# Patient Record
Sex: FEMALE | Race: WHITE | NOT HISPANIC OR LATINO | Employment: FULL TIME
[De-identification: names, ages, dates, MRNs, and addresses within clinical notes are randomized per-mention and may not be internally consistent; named-entity substitution may affect disease eponyms.]

---

## 2017-03-01 ENCOUNTER — RECORDS - HEALTHEAST (OUTPATIENT)
Dept: ADMINISTRATIVE | Facility: OTHER | Age: 52
End: 2017-03-01

## 2017-03-17 ENCOUNTER — COMMUNICATION - HEALTHEAST (OUTPATIENT)
Dept: ADMINISTRATIVE | Facility: CLINIC | Age: 52
End: 2017-03-17

## 2017-03-18 ENCOUNTER — COMMUNICATION - HEALTHEAST (OUTPATIENT)
Dept: MIDWIFE SERVICES | Facility: CLINIC | Age: 52
End: 2017-03-18

## 2017-03-18 ENCOUNTER — AMBULATORY - HEALTHEAST (OUTPATIENT)
Dept: MIDWIFE SERVICES | Facility: CLINIC | Age: 52
End: 2017-03-18

## 2017-03-18 DIAGNOSIS — N95.1 HOT FLASHES, MENOPAUSAL: ICD-10-CM

## 2017-04-12 ENCOUNTER — OFFICE VISIT - HEALTHEAST (OUTPATIENT)
Dept: MIDWIFE SERVICES | Facility: CLINIC | Age: 52
End: 2017-04-12

## 2017-04-12 DIAGNOSIS — N95.1 HOT FLASHES, MENOPAUSAL: ICD-10-CM

## 2017-04-12 DIAGNOSIS — Z12.31 VISIT FOR SCREENING MAMMOGRAM: ICD-10-CM

## 2017-04-12 DIAGNOSIS — I34.1 MITRAL VALVE PROLAPSE: ICD-10-CM

## 2017-04-12 DIAGNOSIS — Z23 NEED FOR VACCINATION: ICD-10-CM

## 2017-04-12 DIAGNOSIS — Z12.39 BREAST CANCER SCREENING: ICD-10-CM

## 2017-04-12 DIAGNOSIS — Z01.419 WELL WOMAN EXAM: ICD-10-CM

## 2017-04-12 LAB
CHOLEST SERPL-MCNC: 233 MG/DL
FASTING STATUS PATIENT QL REPORTED: ABNORMAL
HDLC SERPL-MCNC: 80 MG/DL
LDLC SERPL CALC-MCNC: 140 MG/DL
TRIGL SERPL-MCNC: 67 MG/DL

## 2017-04-12 ASSESSMENT — MIFFLIN-ST. JEOR: SCORE: 1207.31

## 2017-04-17 LAB
HPV INTERPRETATION - HISTORICAL: NORMAL
HPV INTERPRETER - HISTORICAL: NORMAL

## 2017-04-18 LAB
BKR LAB AP ABNORMAL BLEEDING: NO
BKR LAB AP BIRTH CONTROL/HORMONES: NORMAL
BKR LAB AP CERVICAL APPEARANCE: NORMAL
BKR LAB AP GYN ADEQUACY: NORMAL
BKR LAB AP GYN INTERPRETATION: NORMAL
BKR LAB AP HPV REFLEX: NORMAL
BKR LAB AP LMP: 2014
BKR LAB AP PATIENT STATUS: NORMAL
BKR LAB AP PREVIOUS ABNORMAL: NORMAL
BKR LAB AP PREVIOUS NORMAL: NORMAL
HIGH RISK?: NO
PATH REPORT.COMMENTS IMP SPEC: NORMAL
RESULT FLAG (HE HISTORICAL CONVERSION): NORMAL

## 2017-05-11 ENCOUNTER — COMMUNICATION - HEALTHEAST (OUTPATIENT)
Dept: MIDWIFE SERVICES | Facility: CLINIC | Age: 52
End: 2017-05-11

## 2017-08-22 ENCOUNTER — COMMUNICATION - HEALTHEAST (OUTPATIENT)
Dept: FAMILY MEDICINE | Facility: CLINIC | Age: 52
End: 2017-08-22

## 2018-01-13 ENCOUNTER — HOSPITAL ENCOUNTER (OUTPATIENT)
Dept: MAMMOGRAPHY | Facility: HOSPITAL | Age: 53
Discharge: HOME | End: 2018-01-13
Attending: INTERNAL MEDICINE

## 2018-01-13 DIAGNOSIS — Z12.31 VISIT FOR SCREENING MAMMOGRAM: ICD-10-CM

## 2018-01-24 ENCOUNTER — OFFICE VISIT - HEALTHEAST (OUTPATIENT)
Dept: MIDWIFE SERVICES | Facility: CLINIC | Age: 53
End: 2018-01-24

## 2018-01-24 ENCOUNTER — AMBULATORY - HEALTHEAST (OUTPATIENT)
Dept: INTERNAL MEDICINE | Facility: CLINIC | Age: 53
End: 2018-01-24

## 2018-01-24 DIAGNOSIS — Z01.419 WELL WOMAN EXAM: ICD-10-CM

## 2018-01-24 DIAGNOSIS — Z82.49 FAMILY HISTORY OF CORONARY ARTERY DISEASE: ICD-10-CM

## 2018-01-24 DIAGNOSIS — N95.1 HOT FLASHES, MENOPAUSAL: ICD-10-CM

## 2018-01-24 DIAGNOSIS — Z82.49 FAMILY HISTORY OF HEART ATTACK: ICD-10-CM

## 2018-01-24 LAB
CHOLEST SERPL-MCNC: 239 MG/DL
FASTING STATUS PATIENT QL REPORTED: YES
HDLC SERPL-MCNC: 91 MG/DL
LDLC SERPL CALC-MCNC: 134 MG/DL
TRIGL SERPL-MCNC: 69 MG/DL

## 2018-01-24 ASSESSMENT — MIFFLIN-ST. JEOR: SCORE: 1198.24

## 2018-01-29 LAB — LIPOPROTEIN (A) - HISTORICAL: 3 MG/DL (ref 0–30)

## 2018-04-25 ENCOUNTER — OFFICE VISIT - HEALTHEAST (OUTPATIENT)
Dept: MIDWIFE SERVICES | Facility: CLINIC | Age: 53
End: 2018-04-25

## 2018-04-25 DIAGNOSIS — E78.00 HYPERCHOLESTEROLEMIA: ICD-10-CM

## 2018-04-25 DIAGNOSIS — N95.0 POSTMENOPAUSAL VAGINAL BLEEDING: ICD-10-CM

## 2018-04-25 DIAGNOSIS — B00.1 HERPES LABIALIS: ICD-10-CM

## 2018-04-25 LAB
CHOLEST SERPL-MCNC: 213 MG/DL
FASTING STATUS PATIENT QL REPORTED: YES
HDLC SERPL-MCNC: 74 MG/DL
LDLC SERPL CALC-MCNC: 128 MG/DL
TRIGL SERPL-MCNC: 57 MG/DL

## 2018-04-25 ASSESSMENT — MIFFLIN-ST. JEOR: SCORE: 1175.56

## 2018-04-26 ENCOUNTER — COMMUNICATION - HEALTHEAST (OUTPATIENT)
Dept: MIDWIFE SERVICES | Facility: CLINIC | Age: 53
End: 2018-04-26

## 2018-05-04 ENCOUNTER — OFFICE VISIT - HEALTHEAST (OUTPATIENT)
Dept: FAMILY MEDICINE | Facility: CLINIC | Age: 53
End: 2018-05-04

## 2018-05-04 DIAGNOSIS — K13.0 CHEILITIS: ICD-10-CM

## 2018-05-10 ENCOUNTER — OFFICE VISIT - HEALTHEAST (OUTPATIENT)
Dept: OBGYN | Facility: CLINIC | Age: 53
End: 2018-05-10

## 2018-05-10 DIAGNOSIS — N95.0 PMB (POSTMENOPAUSAL BLEEDING): ICD-10-CM

## 2018-05-10 DIAGNOSIS — Z79.890 POSTMENOPAUSAL HRT (HORMONE REPLACEMENT THERAPY): ICD-10-CM

## 2018-05-10 ASSESSMENT — MIFFLIN-ST. JEOR: SCORE: 1171.02

## 2018-05-17 LAB
LAB AP CHARGES (HE HISTORICAL CONVERSION): NORMAL
PATH REPORT.COMMENTS IMP SPEC: NORMAL
PATH REPORT.COMMENTS IMP SPEC: NORMAL
PATH REPORT.FINAL DX SPEC: NORMAL
PATH REPORT.GROSS SPEC: NORMAL
PATH REPORT.MICROSCOPIC SPEC OTHER STN: NORMAL
PATH REPORT.RELEVANT HX SPEC: NORMAL
RESULT FLAG (HE HISTORICAL CONVERSION): NORMAL

## 2018-06-05 ENCOUNTER — RECORDS - HEALTHEAST (OUTPATIENT)
Dept: ADMINISTRATIVE | Facility: OTHER | Age: 53
End: 2018-06-05

## 2018-06-29 ENCOUNTER — RECORDS - HEALTHEAST (OUTPATIENT)
Dept: ADMINISTRATIVE | Facility: OTHER | Age: 53
End: 2018-06-29

## 2018-07-16 ENCOUNTER — COMMUNICATION - HEALTHEAST (OUTPATIENT)
Dept: ADMINISTRATIVE | Facility: CLINIC | Age: 53
End: 2018-07-16

## 2018-10-03 ENCOUNTER — OFFICE VISIT - HEALTHEAST (OUTPATIENT)
Dept: MIDWIFE SERVICES | Facility: CLINIC | Age: 53
End: 2018-10-03

## 2018-10-03 DIAGNOSIS — Z23 NEED FOR VACCINATION: ICD-10-CM

## 2018-10-03 DIAGNOSIS — L65.9 HAIR LOSS: ICD-10-CM

## 2018-10-03 DIAGNOSIS — Z78.9 VEGAN DIET: ICD-10-CM

## 2018-10-03 LAB
CHOLEST SERPL-MCNC: 198 MG/DL
FASTING STATUS PATIENT QL REPORTED: YES
FERRITIN SERPL-MCNC: 77 NG/ML (ref 10–130)
HDLC SERPL-MCNC: 77 MG/DL
HGB BLD-MCNC: 13 G/DL (ref 12–16)
LDLC SERPL CALC-MCNC: 108 MG/DL
T3FREE SERPL-MCNC: 2.4 PG/ML (ref 1.9–3.9)
T4 FREE SERPL-MCNC: 1 NG/DL (ref 0.7–1.8)
TRIGL SERPL-MCNC: 67 MG/DL
TSH SERPL DL<=0.005 MIU/L-ACNC: 1.34 UIU/ML (ref 0.3–5)
VIT B12 SERPL-MCNC: 971 PG/ML (ref 213–816)

## 2018-10-03 ASSESSMENT — MIFFLIN-ST. JEOR: SCORE: 1175.56

## 2018-10-04 LAB — THYROID PEROXIDASE ANTIBODIES - HISTORICAL: <3 IU/ML (ref 0–5.6)

## 2018-10-07 LAB — ZINC SERPL-MCNC: 77 UG/DL (ref 60–120)

## 2018-10-08 ENCOUNTER — COMMUNICATION - HEALTHEAST (OUTPATIENT)
Dept: INTERNAL MEDICINE | Facility: CLINIC | Age: 53
End: 2018-10-08

## 2018-10-17 ENCOUNTER — AMBULATORY - HEALTHEAST (OUTPATIENT)
Dept: NURSING | Facility: CLINIC | Age: 53
End: 2018-10-17

## 2018-10-23 ENCOUNTER — COMMUNICATION - HEALTHEAST (OUTPATIENT)
Dept: ADMINISTRATIVE | Facility: CLINIC | Age: 53
End: 2018-10-23

## 2018-11-13 ENCOUNTER — RECORDS - HEALTHEAST (OUTPATIENT)
Dept: ADMINISTRATIVE | Facility: OTHER | Age: 53
End: 2018-11-13

## 2019-02-05 ENCOUNTER — AMBULATORY - HEALTHEAST (OUTPATIENT)
Dept: OBGYN | Facility: CLINIC | Age: 54
End: 2019-02-05

## 2019-02-05 ENCOUNTER — COMMUNICATION - HEALTHEAST (OUTPATIENT)
Dept: ADMINISTRATIVE | Facility: CLINIC | Age: 54
End: 2019-02-05

## 2019-02-05 DIAGNOSIS — N95.1 HOT FLASHES, MENOPAUSAL: ICD-10-CM

## 2019-02-13 ENCOUNTER — OFFICE VISIT - HEALTHEAST (OUTPATIENT)
Dept: MIDWIFE SERVICES | Facility: CLINIC | Age: 54
End: 2019-02-13

## 2019-02-13 DIAGNOSIS — N95.1 HOT FLASHES, MENOPAUSAL: ICD-10-CM

## 2019-02-13 DIAGNOSIS — Z01.419 WELL WOMAN EXAM: ICD-10-CM

## 2019-02-13 ASSESSMENT — MIFFLIN-ST. JEOR: SCORE: 1195.4

## 2019-03-04 ENCOUNTER — AMBULATORY - HEALTHEAST (OUTPATIENT)
Dept: NURSING | Facility: CLINIC | Age: 54
End: 2019-03-04

## 2019-03-04 DIAGNOSIS — Z23 IMMUNIZATION DUE: ICD-10-CM

## 2019-03-11 ENCOUNTER — OFFICE VISIT - HEALTHEAST (OUTPATIENT)
Dept: FAMILY MEDICINE | Facility: CLINIC | Age: 54
End: 2019-03-11

## 2019-03-11 DIAGNOSIS — B00.2 ORAL HERPES SIMPLEX INFECTION: ICD-10-CM

## 2019-03-11 DIAGNOSIS — L65.9 HAIR LOSS: ICD-10-CM

## 2019-03-11 DIAGNOSIS — E55.9 VITAMIN D DEFICIENCY: ICD-10-CM

## 2019-03-11 DIAGNOSIS — F43.9 STRESS AT HOME: ICD-10-CM

## 2019-03-11 LAB
BASOPHILS # BLD AUTO: 0 THOU/UL (ref 0–0.2)
BASOPHILS NFR BLD AUTO: 1 % (ref 0–2)
CRP SERPL HS-MCNC: 1.2 MG/L (ref 0–3)
EOSINOPHIL # BLD AUTO: 0.3 THOU/UL (ref 0–0.4)
EOSINOPHIL NFR BLD AUTO: 7 % (ref 0–6)
ERYTHROCYTE [DISTWIDTH] IN BLOOD BY AUTOMATED COUNT: 11.3 % (ref 11–14.5)
HCT VFR BLD AUTO: 38.8 % (ref 35–47)
HGB BLD-MCNC: 13 G/DL (ref 12–16)
LYMPHOCYTES # BLD AUTO: 1.5 THOU/UL (ref 0.8–4.4)
LYMPHOCYTES NFR BLD AUTO: 42 % (ref 20–40)
MCH RBC QN AUTO: 30.9 PG (ref 27–34)
MCHC RBC AUTO-ENTMCNC: 33.4 G/DL (ref 32–36)
MCV RBC AUTO: 93 FL (ref 80–100)
MONOCYTES # BLD AUTO: 0.3 THOU/UL (ref 0–0.9)
MONOCYTES NFR BLD AUTO: 8 % (ref 2–10)
NEUTROPHILS # BLD AUTO: 1.5 THOU/UL (ref 2–7.7)
NEUTROPHILS NFR BLD AUTO: 42 % (ref 50–70)
PLATELET # BLD AUTO: 189 THOU/UL (ref 140–440)
PMV BLD AUTO: 7.3 FL (ref 7–10)
RBC # BLD AUTO: 4.19 MILL/UL (ref 3.8–5.4)
WBC: 3.6 THOU/UL (ref 4–11)

## 2019-03-11 ASSESSMENT — MIFFLIN-ST. JEOR: SCORE: 1195.4

## 2019-03-12 ENCOUNTER — COMMUNICATION - HEALTHEAST (OUTPATIENT)
Dept: FAMILY MEDICINE | Facility: CLINIC | Age: 54
End: 2019-03-12

## 2019-03-12 LAB
25(OH)D3 SERPL-MCNC: 43.2 NG/ML (ref 30–80)
25(OH)D3 SERPL-MCNC: 43.2 NG/ML (ref 30–80)

## 2019-06-04 ENCOUNTER — COMMUNICATION - HEALTHEAST (OUTPATIENT)
Dept: MIDWIFE SERVICES | Facility: CLINIC | Age: 54
End: 2019-06-04

## 2019-06-04 DIAGNOSIS — N95.1 HOT FLASHES, MENOPAUSAL: ICD-10-CM

## 2019-06-19 ENCOUNTER — COMMUNICATION - HEALTHEAST (OUTPATIENT)
Dept: MIDWIFE SERVICES | Facility: CLINIC | Age: 54
End: 2019-06-19

## 2019-06-21 ENCOUNTER — AMBULATORY - HEALTHEAST (OUTPATIENT)
Dept: OBGYN | Facility: CLINIC | Age: 54
End: 2019-06-21

## 2019-06-21 DIAGNOSIS — N95.1 HOT FLASHES, MENOPAUSAL: ICD-10-CM

## 2019-09-03 ENCOUNTER — RECORDS - HEALTHEAST (OUTPATIENT)
Dept: ADMINISTRATIVE | Facility: OTHER | Age: 54
End: 2019-09-03

## 2019-09-17 ENCOUNTER — RECORDS - HEALTHEAST (OUTPATIENT)
Dept: ADMINISTRATIVE | Facility: OTHER | Age: 54
End: 2019-09-17

## 2019-10-21 ENCOUNTER — RECORDS - HEALTHEAST (OUTPATIENT)
Dept: ADMINISTRATIVE | Facility: OTHER | Age: 54
End: 2019-10-21

## 2019-10-21 ENCOUNTER — COMMUNICATION - HEALTHEAST (OUTPATIENT)
Dept: INTERNAL MEDICINE | Facility: CLINIC | Age: 54
End: 2019-10-21

## 2019-10-22 ENCOUNTER — RECORDS - HEALTHEAST (OUTPATIENT)
Dept: ADMINISTRATIVE | Facility: OTHER | Age: 54
End: 2019-10-22

## 2019-10-24 ENCOUNTER — OFFICE VISIT - HEALTHEAST (OUTPATIENT)
Dept: INTERNAL MEDICINE | Facility: CLINIC | Age: 54
End: 2019-10-24

## 2019-10-24 DIAGNOSIS — Z12.11 SCREEN FOR COLON CANCER: ICD-10-CM

## 2019-10-24 DIAGNOSIS — Z01.818 PREOP EXAMINATION: ICD-10-CM

## 2019-10-24 DIAGNOSIS — Z12.31 VISIT FOR SCREENING MAMMOGRAM: ICD-10-CM

## 2019-10-24 ASSESSMENT — MIFFLIN-ST. JEOR: SCORE: 1172.72

## 2019-11-05 ENCOUNTER — RECORDS - HEALTHEAST (OUTPATIENT)
Dept: ADMINISTRATIVE | Facility: OTHER | Age: 54
End: 2019-11-05

## 2019-12-10 ENCOUNTER — RECORDS - HEALTHEAST (OUTPATIENT)
Dept: ADMINISTRATIVE | Facility: OTHER | Age: 54
End: 2019-12-10

## 2019-12-15 ENCOUNTER — TRANSFERRED RECORDS (OUTPATIENT)
Dept: MULTI SPECIALTY CLINIC | Facility: CLINIC | Age: 54
End: 2019-12-15
Payer: COMMERCIAL

## 2019-12-15 ENCOUNTER — RECORDS - HEALTHEAST (OUTPATIENT)
Dept: ADMINISTRATIVE | Facility: OTHER | Age: 54
End: 2019-12-15

## 2019-12-15 LAB — COLOGUARD-ABSTRACT: NEGATIVE

## 2020-01-28 ENCOUNTER — RECORDS - HEALTHEAST (OUTPATIENT)
Dept: ADMINISTRATIVE | Facility: OTHER | Age: 55
End: 2020-01-28

## 2020-01-28 ENCOUNTER — COMMUNICATION - HEALTHEAST (OUTPATIENT)
Dept: ADMINISTRATIVE | Facility: CLINIC | Age: 55
End: 2020-01-28

## 2020-01-28 DIAGNOSIS — N95.1 HOT FLASHES, MENOPAUSAL: ICD-10-CM

## 2020-02-26 ENCOUNTER — OFFICE VISIT - HEALTHEAST (OUTPATIENT)
Dept: MIDWIFE SERVICES | Facility: CLINIC | Age: 55
End: 2020-02-26

## 2020-02-26 DIAGNOSIS — Z12.31 VISIT FOR SCREENING MAMMOGRAM: ICD-10-CM

## 2020-02-26 DIAGNOSIS — Z00.00 HEALTHCARE MAINTENANCE: ICD-10-CM

## 2020-02-26 DIAGNOSIS — Z01.419 WELL WOMAN EXAM: ICD-10-CM

## 2020-02-26 DIAGNOSIS — N95.1 HOT FLASHES, MENOPAUSAL: ICD-10-CM

## 2020-02-26 DIAGNOSIS — Z12.39 BREAST CANCER SCREENING: ICD-10-CM

## 2020-02-26 LAB
CHOLEST SERPL-MCNC: 203 MG/DL
FASTING STATUS PATIENT QL REPORTED: YES
HDLC SERPL-MCNC: 78 MG/DL
LDLC SERPL CALC-MCNC: 108 MG/DL
TRIGL SERPL-MCNC: 83 MG/DL

## 2020-02-26 ASSESSMENT — MIFFLIN-ST. JEOR: SCORE: 1181.79

## 2020-03-02 ENCOUNTER — COMMUNICATION - HEALTHEAST (OUTPATIENT)
Dept: MIDWIFE SERVICES | Facility: CLINIC | Age: 55
End: 2020-03-02

## 2020-04-07 ENCOUNTER — RECORDS - HEALTHEAST (OUTPATIENT)
Dept: ADMINISTRATIVE | Facility: OTHER | Age: 55
End: 2020-04-07

## 2020-05-18 ENCOUNTER — COMMUNICATION - HEALTHEAST (OUTPATIENT)
Dept: ADMINISTRATIVE | Facility: CLINIC | Age: 55
End: 2020-05-18

## 2020-05-18 ENCOUNTER — COMMUNICATION - HEALTHEAST (OUTPATIENT)
Dept: MIDWIFE SERVICES | Facility: CLINIC | Age: 55
End: 2020-05-18

## 2020-05-18 DIAGNOSIS — N95.1 HOT FLASHES, MENOPAUSAL: ICD-10-CM

## 2020-05-20 ENCOUNTER — COMMUNICATION - HEALTHEAST (OUTPATIENT)
Dept: ADMINISTRATIVE | Facility: CLINIC | Age: 55
End: 2020-05-20

## 2020-05-20 DIAGNOSIS — N95.1 HOT FLASHES, MENOPAUSAL: ICD-10-CM

## 2020-05-29 ENCOUNTER — COMMUNICATION - HEALTHEAST (OUTPATIENT)
Dept: ADMINISTRATIVE | Facility: CLINIC | Age: 55
End: 2020-05-29

## 2020-06-03 ENCOUNTER — COMMUNICATION - HEALTHEAST (OUTPATIENT)
Dept: MIDWIFE SERVICES | Facility: CLINIC | Age: 55
End: 2020-06-03

## 2020-06-04 ENCOUNTER — AMBULATORY - HEALTHEAST (OUTPATIENT)
Dept: MIDWIFE SERVICES | Facility: CLINIC | Age: 55
End: 2020-06-04

## 2020-06-04 DIAGNOSIS — N95.1 HOT FLASHES, MENOPAUSAL: ICD-10-CM

## 2020-06-08 ENCOUNTER — RECORDS - HEALTHEAST (OUTPATIENT)
Dept: ADMINISTRATIVE | Facility: OTHER | Age: 55
End: 2020-06-08

## 2020-07-22 ENCOUNTER — HOSPITAL ENCOUNTER (OUTPATIENT)
Dept: MAMMOGRAPHY | Facility: CLINIC | Age: 55
Discharge: HOME | End: 2020-07-22
Attending: ADVANCED PRACTICE MIDWIFE

## 2020-07-22 DIAGNOSIS — Z12.31 VISIT FOR SCREENING MAMMOGRAM: ICD-10-CM

## 2020-07-28 ENCOUNTER — COMMUNICATION - HEALTHEAST (OUTPATIENT)
Dept: MIDWIFE SERVICES | Facility: CLINIC | Age: 55
End: 2020-07-28

## 2020-08-27 ENCOUNTER — RECORDS - HEALTHEAST (OUTPATIENT)
Dept: ADMINISTRATIVE | Facility: OTHER | Age: 55
End: 2020-08-27

## 2020-11-11 ENCOUNTER — RECORDS - HEALTHEAST (OUTPATIENT)
Dept: ADMINISTRATIVE | Facility: OTHER | Age: 55
End: 2020-11-11

## 2021-02-10 ENCOUNTER — COMMUNICATION - HEALTHEAST (OUTPATIENT)
Dept: ADMINISTRATIVE | Facility: CLINIC | Age: 56
End: 2021-02-10

## 2021-02-10 ENCOUNTER — COMMUNICATION - HEALTHEAST (OUTPATIENT)
Dept: MIDWIFE SERVICES | Facility: CLINIC | Age: 56
End: 2021-02-10

## 2021-03-17 ENCOUNTER — OFFICE VISIT - HEALTHEAST (OUTPATIENT)
Dept: MIDWIFE SERVICES | Facility: CLINIC | Age: 56
End: 2021-03-17

## 2021-03-17 ENCOUNTER — AMBULATORY - HEALTHEAST (OUTPATIENT)
Dept: LAB | Facility: CLINIC | Age: 56
End: 2021-03-17

## 2021-03-17 DIAGNOSIS — Z12.31 ENCOUNTER FOR SCREENING MAMMOGRAM FOR MALIGNANT NEOPLASM OF BREAST: ICD-10-CM

## 2021-03-17 DIAGNOSIS — Z01.419 WELL WOMAN EXAM: ICD-10-CM

## 2021-03-17 DIAGNOSIS — L68.9 EXCESSIVE HAIR GROWTH: ICD-10-CM

## 2021-03-17 DIAGNOSIS — Z12.11 SCREEN FOR COLON CANCER: ICD-10-CM

## 2021-03-17 DIAGNOSIS — N95.1 HOT FLASHES, MENOPAUSAL: ICD-10-CM

## 2021-03-17 LAB
CHOLEST SERPL-MCNC: 224 MG/DL
FASTING STATUS PATIENT QL REPORTED: YES
FASTING STATUS PATIENT QL REPORTED: YES
GLUCOSE BLD-MCNC: 94 MG/DL (ref 70–125)
HDLC SERPL-MCNC: 72 MG/DL
HIV 1+2 AB+HIV1 P24 AG SERPL QL IA: NEGATIVE
LDLC SERPL CALC-MCNC: 137 MG/DL
TRIGL SERPL-MCNC: 76 MG/DL
VIT B12 SERPL-MCNC: 549 PG/ML (ref 213–816)

## 2021-03-17 RX ORDER — ESTRADIOL 1 MG/1
1 TABLET ORAL DAILY
Qty: 90 TABLET | Refills: 3 | Status: SHIPPED | OUTPATIENT
Start: 2021-03-17 | End: 2022-03-17

## 2021-03-17 RX ORDER — MEDROXYPROGESTERONE ACETATE 2.5 MG/1
2.5 TABLET ORAL DAILY
Qty: 90 TABLET | Refills: 3 | Status: SHIPPED | OUTPATIENT
Start: 2021-03-17 | End: 2022-05-09

## 2021-03-17 ASSESSMENT — MIFFLIN-ST. JEOR: SCORE: 1190.3

## 2021-03-17 ASSESSMENT — PATIENT HEALTH QUESTIONNAIRE - PHQ9: SUM OF ALL RESPONSES TO PHQ QUESTIONS 1-9: 6

## 2021-03-18 LAB
25(OH)D3 SERPL-MCNC: 36.8 NG/ML (ref 30–80)
HCV AB SERPL QL IA: NEGATIVE

## 2021-03-29 ENCOUNTER — RECORDS - HEALTHEAST (OUTPATIENT)
Dept: ADMINISTRATIVE | Facility: OTHER | Age: 56
End: 2021-03-29

## 2021-03-29 ENCOUNTER — RECORDS - HEALTHEAST (OUTPATIENT)
Dept: BONE DENSITY | Facility: CLINIC | Age: 56
End: 2021-03-29

## 2021-03-29 DIAGNOSIS — Z01.419 ENCOUNTER FOR GYNECOLOGICAL EXAMINATION (GENERAL) (ROUTINE) WITHOUT ABNORMAL FINDINGS: ICD-10-CM

## 2021-04-13 ENCOUNTER — RECORDS - HEALTHEAST (OUTPATIENT)
Dept: ADMINISTRATIVE | Facility: OTHER | Age: 56
End: 2021-04-13

## 2021-04-28 ENCOUNTER — OFFICE VISIT - HEALTHEAST (OUTPATIENT)
Dept: INTERNAL MEDICINE | Facility: CLINIC | Age: 56
End: 2021-04-28

## 2021-04-28 DIAGNOSIS — I34.1 MITRAL VALVE PROLAPSE: ICD-10-CM

## 2021-04-28 DIAGNOSIS — R00.2 PALPITATIONS: ICD-10-CM

## 2021-04-28 LAB
T4 FREE SERPL-MCNC: 1.1 NG/DL (ref 0.7–1.8)
TSH SERPL DL<=0.005 MIU/L-ACNC: 1.21 UIU/ML (ref 0.3–5)

## 2021-04-28 ASSESSMENT — MIFFLIN-ST. JEOR: SCORE: 1181.22

## 2021-04-29 LAB
ATRIAL RATE - MUSE: 63 BPM
DIASTOLIC BLOOD PRESSURE - MUSE: NORMAL
INTERPRETATION ECG - MUSE: NORMAL
P AXIS - MUSE: 62 DEGREES
PR INTERVAL - MUSE: 148 MS
QRS DURATION - MUSE: 94 MS
QT - MUSE: 416 MS
QTC - MUSE: 425 MS
R AXIS - MUSE: 29 DEGREES
SYSTOLIC BLOOD PRESSURE - MUSE: NORMAL
T AXIS - MUSE: 40 DEGREES
VENTRICULAR RATE- MUSE: 63 BPM

## 2021-05-27 ASSESSMENT — PATIENT HEALTH QUESTIONNAIRE - PHQ9: SUM OF ALL RESPONSES TO PHQ QUESTIONS 1-9: 6

## 2021-05-29 NOTE — TELEPHONE ENCOUNTER
Central PA team  [phone]  Pool: HE PA MED ([id number])          PA has been initiated.       PA form completed and faxed insurance via Cover My Meds     Key:   [id number] Case ID: [id number]     Medication:  Provera 2.5MG tablets    Insurance:  [employer]        Response will be received via fax and may take up to 5-10 business days depending on plan

## 2021-05-29 NOTE — TELEPHONE ENCOUNTER
I have sent in new Rx for prometrium per note on Rx.  Could you let the patient know?  Thanks  EDDI

## 2021-05-29 NOTE — TELEPHONE ENCOUNTER
Medication Name:   PROVERA 2.5 mg tablet 90 tablet 3 6/4/2019     Sig - Route: Take 1 tablet (2.5 mg total) by mouth daily. - Oral    Sent to pharmacy as: PROVERA 2.5 mg tablet    Notes to Pharmacy: If Prometrium 100 mg it is a less expensive option for this patient, please feel free to substitute per patient.    E-Prescribing Status: Receipt confirmed by pharmacy (6/4/2019  6:06 PM CDT)          Insurance Plan: [employer] LIS[employer] FROST:    Patient ID Number: [id number]    Please start PA process

## 2021-05-30 VITALS — WEIGHT: 137 LBS | BODY MASS INDEX: 22.82 KG/M2 | HEIGHT: 65 IN

## 2021-05-31 VITALS — BODY MASS INDEX: 22.49 KG/M2 | HEIGHT: 65 IN | WEIGHT: 135 LBS

## 2021-06-01 VITALS — BODY MASS INDEX: 21.66 KG/M2 | HEIGHT: 65 IN | WEIGHT: 130 LBS

## 2021-06-01 VITALS — BODY MASS INDEX: 21.66 KG/M2 | WEIGHT: 130 LBS | HEIGHT: 65 IN

## 2021-06-01 VITALS — BODY MASS INDEX: 21.72 KG/M2 | WEIGHT: 130.5 LBS

## 2021-06-01 VITALS — WEIGHT: 129 LBS | HEIGHT: 65 IN | BODY MASS INDEX: 21.49 KG/M2

## 2021-06-02 VITALS — WEIGHT: 137 LBS | BODY MASS INDEX: 23.39 KG/M2 | HEIGHT: 64 IN

## 2021-06-02 VITALS — HEIGHT: 64 IN | WEIGHT: 137 LBS | BODY MASS INDEX: 23.39 KG/M2

## 2021-06-02 NOTE — TELEPHONE ENCOUNTER
New Appointment Needed  What is the reason for the visit:    Pre-Op Appt Request  When is the surgery? :  10.29.19  Where is the surgery?:   [facility] in [place]  Who is the surgeon? :  Dr. Pichardo  What type of surgery is being done?: right shoulder rotator cuff and torn bicep  Provider Preference: Any available  How soon do you need to be seen?: this week  Waitlist offered?: No  Okay to leave a detailed message:  Yes

## 2021-06-02 NOTE — PATIENT INSTRUCTIONS - HE
1. No contraindication to having the surgery.     2. cologuard will be sent.     3. Set up mammogram as discussed.

## 2021-06-02 NOTE — PROGRESS NOTES
Preoperative Exam    Scheduled Procedure: Right Shoulder Rotator cuff and torn bicep  Surgery Date:  10/29/19  Surgery Location: [facility] Fax: [phone]    Surgeon:  Dr. Pichardo    Assessment/Plan:     1. Visit for screening mammogram  Last mammogram 1/2018. She plans to schedule.    2. Screen for colon cancer  Not high risk, agrees to cologuard.   - Cologuard    3. Preop examination  She is medically stable.  Her exam and history today reveal no contraindication to having the right shoulder surgery.  I recommend proceeding as planned.      Surgical Procedure Risk: Low (reported cardiac risk generally < 1%)  Have you had prior anesthesia?: Yes  Have you or any family members had a previous anesthesia reaction:  No  Do you or any family members have a history of a clotting or bleeding disorder?: No  Cardiac Risk Assessment: no increased risk for major cardiac complications    APPROVAL GIVEN to proceed with proposed procedure, without further diagnostic evaluation    She has history of mitral valve prolapse, asymptomatic.  No murmur or click heard today.     Functional Status: Independent  Patient plans to recover at home with family.     Subjective:      Jeni Cortes is a 54 y.o. female who presents for a preoperative consultation. She has elected to proceed with right shoulder surgery for rotator cuff disorder.  She feels well, otherwise, without unusual dyspnea or any cough or fever, or symptoms of illness.  No history of clotting or bleeding. No palpitations or history of symptoms from mitral valve prolapse    All other systems reviewed and are negative, other than those listed in the HPI.    Pertinent History  Do you have difficulty breathing or chest pain after walking up a flight of stairs: No  History of obstructive sleep apnea: No  Steroid use in the last 6 months: No  Frequent Aspirin/NSAID use: No  Prior Blood Transfusion: No  Prior Blood Transfusion Reaction: Unknwn  If for some reason  prior to, during or after the procedure, if it is medically indicated, would you be willing to have a blood transfusion?:  There is no transfusion refusal.    Current Outpatient Medications   Medication Sig Dispense Refill     cyanocobalamin, vitamin B-12, (VITAMIN B12 ORAL) Take by mouth.       ESTRACE 1 mg tablet Take 1 tablet (1 mg total) by mouth daily. 90 tablet 1     multivitamin therapeutic tablet Take 1 tablet by mouth daily.       progesterone (PROMETRIUM) 100 MG capsule Take 1 capsule (100 mg total) by mouth daily. 90 capsule 4     BIOTIN ORAL Take by mouth.       PROVERA 2.5 mg tablet Take 1 tablet (2.5 mg total) by mouth daily. 90 tablet 3     No Known Allergies    Patient Active Problem List   Diagnosis     Shoulder Tendonitis     Hot flashes, menopausal     Well woman exam     Mitral valve prolapse     Breast cancer screening     Visit for screening mammogram     Hypercholesterolemia     Herpes labialis     Past Medical History:   Diagnosis Date     Mitral valve prolapse      Past Surgical History:   Procedure Laterality Date     NO PAST SURGERIES       Social History     Socioeconomic History     Marital status:      Spouse name: Sharon     Number of children: 1     Years of education: Not on file     Highest education level: Not on file   Occupational History     Occupation: Marketing   Social Needs     Financial resource strain: Not on file     Food insecurity:     Worry: Not on file     Inability: Not on file     Transportation needs:     Medical: Not on file     Non-medical: Not on file   Tobacco Use     Smoking status: Never Smoker     Smokeless tobacco: Never Used   Substance and Sexual Activity     Alcohol use: Yes     Drug use: No     Sexual activity: Yes     Partners: Male     Birth control/protection: Post-menopausal   Lifestyle     Physical activity:     Days per week: Not on file     Minutes per session: Not on file     Stress: Not on file   Relationships     Social connections:      "Talks on phone: Not on file     Gets together: Not on file     Attends Scientology service: Not on file     Active member of club or organization: Not on file     Attends meetings of clubs or organizations: Not on file     Relationship status: Not on file     Intimate partner violence:     Fear of current or ex partner: Not on file     Emotionally abused: Not on file     Physically abused: Not on file     Forced sexual activity: Not on file   Other Topics Concern     Not on file   Social History Narrative          Patient Care Team:  Maurice López MD as PCP - Joanna Mccarthy MD as Assigned PCP    Objective:     Vitals:    10/24/19 0906   BP: 110/70   Pulse: 66   SpO2: 100%   Weight: 132 lb (59.9 kg)   Height: 5' 4.25\" (1.632 m)     PHYSICAL EXAM:  General Appearance: In no acute distress  /70 (Patient Site: Left Arm, Patient Position: Sitting, Cuff Size: Adult Regular)   Pulse 66   Ht 5' 4.25\" (1.632 m)   Wt 132 lb (59.9 kg)   SpO2 100%   BMI 22.48 kg/m    NECK:  without cervical or axillary adenopathy  RESPIRATORY: Clear  CARDIOVASCULAR: S1, S2   ABDOMEN: soft, flat, and non-tender  EXTREMITIES: No joint swelling, or inflammation, no ulcer or edema  NEUROLOGIC: No arm or leg  weakness, speech is clear, gait is normal  PSYCHIATRIC: Oriented X 3, without confusion, behavior and affect normal, thinking is clear    Patient Instructions   1. No contraindication to having the surgery.     2. cologuard will be sent.     3. Set up mammogram as discussed.     Immunization History   Administered Date(s) Administered     Influenza, seasonal,quad inj 6-35 mos 10/27/2014     Influenza,seasonal quad, PF, =/> 6months 10/03/2018     Tdap 04/12/2017     ZOSTER, RECOMBINANT, IM 10/17/2018, 03/04/2019     Electronically signed by Maurice López MD 10/25/19 9:09 AM  "

## 2021-06-03 ENCOUNTER — HOSPITAL ENCOUNTER (OUTPATIENT)
Dept: CARDIOLOGY | Facility: CLINIC | Age: 56
Discharge: HOME | End: 2021-06-03
Attending: INTERNAL MEDICINE

## 2021-06-03 ENCOUNTER — COMMUNICATION - HEALTHEAST (OUTPATIENT)
Dept: INTERNAL MEDICINE | Facility: CLINIC | Age: 56
End: 2021-06-03

## 2021-06-03 VITALS
BODY MASS INDEX: 22.53 KG/M2 | OXYGEN SATURATION: 100 % | DIASTOLIC BLOOD PRESSURE: 70 MMHG | HEIGHT: 64 IN | HEART RATE: 66 BPM | SYSTOLIC BLOOD PRESSURE: 110 MMHG | WEIGHT: 132 LBS

## 2021-06-03 DIAGNOSIS — R00.2 PALPITATIONS: ICD-10-CM

## 2021-06-03 LAB
AORTIC ROOT: 1.9 CM
AORTIC VALVE MEAN VELOCITY: 88.3 CM/S
ASCENDING AORTA: 2.9 CM
AV DIMENSIONLESS INDEX VTI: 0.6
AV MEAN GRADIENT: 4 MMHG
AV PEAK GRADIENT: 6.9 MMHG
AV VELOCITY RATIO: 0.7
BSA FOR ECHO PROCEDURE: 1.66 M2
CV BLOOD PRESSURE: NORMAL MMHG
CV ECHO HEIGHT: 64.5 IN
CV ECHO WEIGHT: 133 LBS
DOP CALC AO PEAK VEL: 131 CM/S
DOP CALC AO VTI: 31 CM
DOP CALC LVOT PEAK VEL: 90.2 CM/S
DOP CALCLVOT PEAK VEL VTI: 19.7 CM
EJECTION FRACTION: 64 % (ref 55–75)
FRACTIONAL SHORTENING: 35.8 % (ref 28–44)
INTERVENTRICULAR SEPTUM IN END DIASTOLE: 0.9 CM (ref 0.6–0.9)
IVS/PW RATIO: 1.2
LA AREA 1: 16.5 CM2
LA AREA 2: 17.8 CM2
LEFT ATRIUM LENGTH: 4.49 CM
LEFT ATRIUM SIZE: 2.9 CM
LEFT ATRIUM VOLUME INDEX: 33.5 ML/M2
LEFT ATRIUM VOLUME: 55.6 ML
LEFT VENTRICLE DIASTOLIC VOLUME INDEX: 54.2 CM3/M2 (ref 29–61)
LEFT VENTRICLE DIASTOLIC VOLUME: 90 CM3 (ref 46–106)
LEFT VENTRICLE HEART RATE: 62 BPM
LEFT VENTRICLE MASS INDEX: 73.5 G/M2
LEFT VENTRICLE SYSTOLIC VOLUME INDEX: 19.3 CM3/M2 (ref 8–24)
LEFT VENTRICLE SYSTOLIC VOLUME: 32 CM3 (ref 14–42)
LEFT VENTRICULAR INTERNAL DIMENSION IN DIASTOLE: 4.64 CM (ref 3.8–5.2)
LEFT VENTRICULAR INTERNAL DIMENSION IN SYSTOLE: 2.98 CM (ref 2.2–3.5)
LEFT VENTRICULAR MASS: 122.1 G
LEFT VENTRICULAR OUTFLOW TRACT MEAN GRADIENT: 2 MMHG
LEFT VENTRICULAR OUTFLOW TRACT MEAN VELOCITY: 60.9 CM/S
LEFT VENTRICULAR OUTFLOW TRACT PEAK GRADIENT: 3 MMHG
LEFT VENTRICULAR POSTERIOR WALL IN END DIASTOLE: 0.73 CM (ref 0.6–0.9)
MITRAL VALVE E/A RATIO: 1.8
MV AVERAGE E/E' RATIO: 8.9 CM/S
MV DECELERATION TIME: 211 MS
MV E'TISSUE VEL-LAT: 8.87 CM/S
MV E'TISSUE VEL-MED: 8.29 CM/S
MV LATERAL E/E' RATIO: 8.6
MV MEDIAL E/E' RATIO: 9.2
MV PEAK A VELOCITY: 41.5 CM/S
MV PEAK E VELOCITY: 76 CM/S
NUC REST DIASTOLIC VOLUME INDEX: 2128 LBS
NUC REST SYSTOLIC VOLUME INDEX: 64.5 IN
PV ACCELERATION TIME: 190 MS
TRICUSPID REGURGITATION PEAK PRESSURE GRADIENT: 26.4 MMHG
TRICUSPID VALVE ANULAR PLANE SYSTOLIC EXCURSION: 2.7 CM
TRICUSPID VALVE PEAK REGURGITANT VELOCITY: 257 CM/S

## 2021-06-03 ASSESSMENT — MIFFLIN-ST. JEOR: SCORE: 1181.22

## 2021-06-04 VITALS
SYSTOLIC BLOOD PRESSURE: 108 MMHG | DIASTOLIC BLOOD PRESSURE: 60 MMHG | HEIGHT: 64 IN | HEART RATE: 60 BPM | BODY MASS INDEX: 22.88 KG/M2 | WEIGHT: 134 LBS

## 2021-06-05 VITALS
DIASTOLIC BLOOD PRESSURE: 62 MMHG | SYSTOLIC BLOOD PRESSURE: 110 MMHG | HEIGHT: 65 IN | BODY MASS INDEX: 22.16 KG/M2 | OXYGEN SATURATION: 99 % | WEIGHT: 133 LBS | HEART RATE: 64 BPM

## 2021-06-05 VITALS
HEIGHT: 65 IN | DIASTOLIC BLOOD PRESSURE: 64 MMHG | WEIGHT: 135 LBS | HEART RATE: 80 BPM | SYSTOLIC BLOOD PRESSURE: 108 MMHG | BODY MASS INDEX: 22.49 KG/M2

## 2021-06-05 NOTE — TELEPHONE ENCOUNTER
Telephone call to patient.  Advised that medication was refilled for 90 days.  Advised that patient will be due to be seen for her annual exam in February, 2020.  Requested that she call back to schedule.  Contact information provided.

## 2021-06-05 NOTE — TELEPHONE ENCOUNTER
Pt went to  her refills of medications she has taken for years and it looks like the progesterone got filled but not the estrodyl. Please call pt to let her know once that is filled and she can pick it up. Confirmed Pharmacy as [facility] in [place].

## 2021-06-06 NOTE — PROGRESS NOTES
Assessment:   1.  Annual well woman exam  2.  Healthcare maintenance  3.  Hormone replacement therapy for postmenopausal hot flashes  4.  History of hypercholesterolemia, resolved after implementing vegan diet  5.  Vegan diet     Plan:      1. Discussed nutrition and exercise.  Advised MVI, Vitamin D3 4000IU geltab and an omega 3 supplement daily   2. Blood tests: Fasting lipid panel  3. Breast self exam technique reviewed and patient encouraged to perform self-exam monthly.  Screening mammogram ordered  4. Contraception: Postmenopausal  5.  Next pap due 2022. HPV co-testing discussed with that pap, then paps q 5 yrs if both negative.  6.  Estrace and Prometrium ordered for 1 year.  7.  RTC 1 year for annual physical exam, PRN    Subjective:      Jeni Cortes is a 54 y.o. female who presents for an annual exam.  Denies unusual vaginal discharge, dyspareunia, lesions, bloating or vaginal bleeding.    Healthy Habits:   Regular Exercise: Yes   Sunscreen Use: Yes  Healthy Diet: Yes  Dental Visits Regularly: Yes  Seat Belt: Yes  Sexually active: Yes  STI risk No  domestic violence No    Self Breast Exam Monthly:Yes  Colonoscopy: Planned with Dr. López to perform Cologuard  Lipid Profile: Yes  Glucose Screen: No  Prevention of Osteoporosis: Yes  Last Dexa: N/A      Immunization History   Administered Date(s) Administered     Influenza, seasonal,quad inj 6-35 mos 10/27/2014     Influenza,seasonal quad, PF, =/> 6months 10/03/2018     Tdap 2017     ZOSTER, RECOMBINANT, IM 10/17/2018, 2019     Immunization status: up to date and documented.    Gynecologic History  No LMP recorded. Patient is postmenopausal.  Contraception: post menopausal status    Cancer screening:   Last Pap: 2017. Results were: Normal, negative HPV  Last mammogram: 2018. Results were: normal      OB History    Para Term  AB Living   1 1 1 0 0 1   SAB TAB Ectopic Multiple Live Births   0 0 0 0 1      #  Discharge Summary - [address] [facility]    Patient Information: Srinath Cheek 48 y o  female MRN: [id number]  Unit/Bed#: -01 Encounter: [id number]    Discharging Physician / Practitioner: Conchita Aldana DO  PCP: Lesly Rhodes DO  Admission Date: 1/27/2019  Discharge Date: 01/28/19    Disposition:     Home    Reason for Admission:  Hypertensive urgency    Discharge Diagnoses:     Principal Problem:    Hypertensive urgency  Active Problems:    Acute nonintractable headache  Resolved Problems:    * No resolved hospital problems  *      Consultations During Hospital Stay:  · None    Procedures Performed:     · None    Significant Findings / Test Results:     · Blood pressure on admission was 244/124  This was at 5:31 p m     By 8:36 p m , the blood pressure was 203/97  By 9:00 p m  The blood pressure was 170/93  Overnight the blood pressure has been ranging between 138/80 to 168/92  This is prior to amlodipine dosing  After amlodipine dosing today, blood pressure is 142/96  · CT head - no acute intracranial abnormality  · Metabolic profile within normal limits  · CBC initially with a white blood cell count of 10 84  However, by the following morning was 9 63   · Hemoglobin is stable  Platelet counts normal   · Troponin normal     Incidental Findings:   · None     Test Results Pending at Discharge (will require follow up): · None     Outpatient Tests Requested:  · Repeat blood pressure within 1 week    Complications:  None    Hospital Course:     Srinath Cheek is a 48 y o  female patient who originally presented to the hospital on 1/27/2019 due to headache and lightheadedness  She states she began having symptoms of sinus congestion and dry cough for the past couple weeks and had plans to go to urgent care today for further valuation    She says that today she developed intermittent headache on the top of her head, and further reports that just before leaving for urgent care, she got Outcome Date GA Lbr Elijah/2nd Weight Sex Delivery Anes PTL Lv   1 Term     M    OMAR       Current Outpatient Medications   Medication Sig Dispense Refill     cyanocobalamin, vitamin B-12, (VITAMIN B12 ORAL) Take by mouth.       ESTRACE 1 mg tablet Take 1 tablet (1 mg total) by mouth daily. 90 tablet 3     multivitamin therapeutic tablet Take 1 tablet by mouth daily.       progesterone (PROMETRIUM) 100 MG capsule Take 1 capsule (100 mg total) by mouth daily. 90 capsule 3     No current facility-administered medications for this visit.      Past Medical History:   Diagnosis Date     Mitral valve prolapse      Past Surgical History:   Procedure Laterality Date     NO PAST SURGERIES       SHOULDER ARTHROSCOPY Right 10/2019     WISDOM TOOTH EXTRACTION       Patient has no known allergies.  Family History   Problem Relation Age of Onset     Asthma Mother      Depression Mother      Vision loss Mother      Cancer Mother         kidney     Dementia Father      Hearing loss Father      Heart disease Father      Heart attack Father 59     Breast cancer Sister      Vision loss Sister      Depression Son      Social History     Socioeconomic History     Marital status:      Spouse name: Sharon     Number of children: 1     Years of education: Not on file     Highest education level: Not on file   Occupational History     Occupation: Marketing   Social Needs     Financial resource strain: Not on file     Food insecurity:     Worry: Not on file     Inability: Not on file     Transportation needs:     Medical: Not on file     Non-medical: Not on file   Tobacco Use     Smoking status: Never Smoker     Smokeless tobacco: Never Used   Substance and Sexual Activity     Alcohol use: Yes     Drug use: No     Sexual activity: Yes     Partners: Male     Birth control/protection: Post-menopausal   Lifestyle     Physical activity:     Days per week: Not on file     Minutes per session: Not on file     Stress: Not on file  up to go to the bathroom and had visual cahgnes she describes as "kalaeidoscope vision" and blurred that lasted about two minutes  She also felt very lightheaded  She states she was able to feel her way to the bed and did not pass out although she felt she was close  She also admits to nausea  Denies any numbness, tingling or paraesthesias of the face or extremities  She only reports resolved heaviness of her LE's when she became lightheaded  No weakness  She denies vomiting or abdominal pain  Reports one episode of diarrhea this AM  Denies any fever or chills  Samantha chest pain or SOB  Denies tinnitus  Denies any significant diet change in the form of increased salt intake or packaged foods  Patient states she has had HTN and has required trips to the ED previously for symptomatic hypertension  She also states she had been on what she believes was a combo pill of benazepril and amlodipine which she stopped herself as she felt well and did not like taking medication  She does state however that she has recently seen the [address] [address] team as an OP and was told that her BP's had been controlled  The patient was admitted into the hospital for further evaluation and control of the blood pressure  The patient was given a dose of labetalol at 6:35 p m  On the day of admission and a dose of 10 mg hydralazine IV at 7:18 p m  And then a 2nd dose of 10 mg hydralazine at 8:34 p m     The patient's blood pressures been maintained overnight in the 140-160 range predominately  The morning of discharge patient was started on amlodipine with affects noted above  The patient did have a headache earlier this morning but the headache is now resolved  The patient has had no additional vision issues and also has had no nausea  The patient feels good this morning  We did discuss also a low-sodium diet to help with blood pressure as well    The patient will need to follow up with her primary care physician within 1 week to "  Relationships     Social connections:     Talks on phone: Not on file     Gets together: Not on file     Attends [employer] service: Not on file     Active member of club or organization: Not on file     Attends meetings of clubs or organizations: Not on file     Relationship status: Not on file     Intimate partner violence:     Fear of current or ex partner: Not on file     Emotionally abused: Not on file     Physically abused: Not on file     Forced sexual activity: Not on file   Other Topics Concern     Not on file   Social History Narrative            Review of Systems  General:  Denies problem  Eyes: Denies problem  Ears/Nose/Throat: Denies problem  Cardiovascular: Denies problem  Respiratory:  Denies problem  Gastrointestinal:  denies problems  Genitourinary: denies problems  Musculoskeletal:  Denies problem  Skin: Denies problem  Neurologic:denies problems  Psychiatric: denies problems  Endocrine: denies problems    Feels like hair on her head is thinning.    Objective:         Vitals:    02/26/20 1045   BP: 108/60   Pulse: 60   Weight: 134 lb (60.8 kg)   Height: 5' 4.25\" (1.632 m)       Physical Exam:  General Appearance: Alert, cooperative, no distress, appears stated age  Skin: Skin color, texture, turgor normal, no rashes or lesions  Throat: Lips, mucosa, and tongue normal; teeth and gums normal  HEENT: grossly normal; otoscopic and opthalmic exam not performed.   Neck: Symmetrical, trachea midline  Pelvic: Patient declined    " have blood pressure rechecked and potentially additional titration of her medication  At this point, we do feel the patient can be safely discharged from the hospital     Condition at Discharge: stable     Discharge Day Visit / Exam:     Subjective: The patient had headache earlier this morning but her headache is now resolved  The patient states that she has been getting blurred vision intermittently but nothing since she has been here  The patient has no nausea and also denies any chest pain or palpitations  The patient denies any dyspnea  The patient states that she was on antihypertensive medication in the past but she did not feel that she needed any more so she stopped the medication  She believes that she was on amlodipine/benazepril and tolerated this well  Vitals: Blood Pressure: 168/92 (01/28/19 0700)  Pulse: 73 (01/28/19 0700)  Temperature: 98 9 °F (37 2 °C) (01/28/19 0700)  Temp Source: Oral (01/28/19 0700)  Respirations: 18 (01/28/19 0700)  Height: 4' 10" (147 3 cm) (01/27/19 2112)  Weight - Scale: 56 9 kg (125 lb 7 1 oz) (01/27/19 2112)  SpO2: 96 % (01/28/19 0700)  Exam:   Physical Exam   Constitutional: She is oriented to person, place, and time  No distress  HENT:   Mouth/Throat: Oropharynx is clear and moist  No oropharyngeal exudate  Eyes: Pupils are equal, round, and reactive to light  Conjunctivae are normal    Neck:   No carotid bruits   Cardiovascular: Normal rate and regular rhythm  No murmur heard  Pulmonary/Chest: Effort normal  She has no wheezes  She has no rales  Abdominal: Soft  Bowel sounds are normal  She exhibits no distension  There is no tenderness  Musculoskeletal: She exhibits no edema or tenderness  Neurological: She is alert and oriented to person, place, and time  No cranial nerve deficit  She exhibits normal muscle tone  Skin: Skin is warm and dry  Psychiatric: She has a normal mood and affect  Vitals reviewed      Discussion with Family: Patient only     Discharge instructions/Information to patient and family:   See after visit summary for information provided to patient and family  Provisions for Follow-Up Care:  See after visit summary for information related to follow-up care and any pertinent home health orders  Planned Readmission: None     Discharge Statement:  I spent 26 minutes discharging the patient  This time was spent on the day of discharge  I had direct contact with the patient on the day of discharge  Greater than 50% of the total time was spent examining patient, answering all patient questions, arranging and discussing plan of care with patient as well as directly providing post-discharge instructions  Additional time then spent on discharge activities  Discharge Medications:  See after visit summary for reconciled discharge medications provided to patient and family        ** Please Note: This note has been constructed using a voice recognition system **

## 2021-06-08 NOTE — TELEPHONE ENCOUNTER
Megan had ordered meds for pt and there is a mix up with what was filled. Pt takes estrodyl and Medroxy progesterone and it was just the progesterone that was ordered/filled? It needs to be the Medroxy progesterone. Please assist in getting this ordered correctly.

## 2021-06-08 NOTE — TELEPHONE ENCOUNTER
Return call to patient.  Patient states that she went to  her refills and noticed that the progesterone pills look different than what she had been receiving.  She states that she thought she was prescribed medroxyprogesterone/Provera.  Advised that that Rx was changed to Prometrium in June, 2019, due to her insurance company not covering medroxyprogesterone.  Advised that Provera has been prescribed since 6/21/2019.  Patient plans to confirm that her pharmacy has been filling Provera and ask why this current refill looks different than what she had been getting.  Patient offers no further questions or concerns at this time.

## 2021-06-08 NOTE — TELEPHONE ENCOUNTER
Completed as requested.  Prescription for Provera 2.5 mg 1 p.o. daily, #30, 11 refills sent to preferred pharmacy.

## 2021-06-08 NOTE — TELEPHONE ENCOUNTER
[facility]s call [facility] still does not have the RX for the Medroxy Progesterone. Pls call pt. [phone] Ok to leave a message.

## 2021-06-08 NOTE — TELEPHONE ENCOUNTER
Pt said [facility] is having a hard time getting her meds refilled. She sees [facility] and needs refills on the Estradial and Medroxy Progesterone. [facility] [address] in [facility] is her pharmacy. Pls call and let her know when sent, ok to leave a detailed message.

## 2021-06-08 NOTE — TELEPHONE ENCOUNTER
Patient called states she has always been on Provera and not the Prometrium that we have sent to the pharmacy most recently she would like a RX for Provera ([facility]). She states this has worked best for her for years. Please advise on new RX.

## 2021-06-10 NOTE — PROGRESS NOTES
Assessment:     1.  Annual well woman exam  2.  Cervical cancer screening  3.  Breast cancer screening  4.  Hot flashes treated with hormone replacement therapy     Plan:      1. Discussed nutrition and exercise.  Advised MVI, Vitamin D3 4000IU geltab and an omega 3 supplement daily   2. Blood tests: Fasting lipid cascade today per patient request and vitamin D3 level.  3. Breast self exam technique reviewed and patient encouraged to perform self-exam monthly.    4. Contraception: Postmenopausal.  5.  Next pap due now. HPV co-testing discussed with that pap, then paps q 5 yrs if both negative.  6.  RTC 1 year for annual physical exam, PRN  7.  Referred for screening mammogram.  8.  Declines referral for colonoscopy.  9.  Consulted with Dr. Lupe Marsh with [facility] regarding hormone replacement therapy.  Okay to continue current regimen of Provera 2.5 mg and Estrace 1 mg.  Next year, consider halfing dosage, and reevaluate hot flash symptoms.  10. Tdap accepted today.      Subjective:      Jeni Cortes is a 51 y.o. female who presents for an annual exam.  Requesting cholesterol panel and renewal of hormone replacement therapy.  Attempted to decrease dosage of HRT to half tablet per day, but hot flash symptoms recurred and patient resumed usual dosing at this time.   living in the [place] without plans to move to [place] anytime soon.  Patient's son, Vernon, is suffering from depression and substance use.  Patient is concerned about him but reports healthy boundaries and healthy self-cares.  Patient denies signs or symptoms of anxiety or depression herself.    Healthy Habits:   Regular Exercise: Yes   Healthy Diet: Yes  Dental Visits Regularly: Yes  Seat Belt: Yes  Sexually active: No  STI risk No  domestic violence No    Self Breast Exam Monthly:Yes  Colonoscopy: No  Lipid Profile: Requesting today  Glucose Screen: No  Prevention of Osteoporosis: No  Last Dexa: N/A      Immunization  History   Administered Date(s) Administered     Influenza, seasonal,quad inj 6-35 mos 10/27/2014     Tdap 2017     Immunization status: delayed.    Gynecologic History  No LMP recorded. Patient is not currently having periods (Reason: Premenopausal).  Contraception: post menopausal status    Cancer screening:   Last Pap: Unknown. Results were: normal  Last mammogram: 3 years ago. Results were: normal      OB History    Para Term  AB SAB TAB Ectopic Multiple Living   1 1 1 0 0 0 0 0 0 2      # Outcome Date GA Lbr Elijah/2nd Weight Sex Delivery Anes PTL Lv   1 Term     M    Y          Current Outpatient Prescriptions   Medication Sig Dispense Refill     ESTRACE 1 mg tablet Take 1 tablet (1 mg total) by mouth daily. 90 tablet 3     PROVERA 2.5 mg tablet Take 1 tablet (2.5 mg total) by mouth daily. 90 tablet 3     No current facility-administered medications for this visit.      Past Medical History:   Diagnosis Date     Mitral valve prolapse      History reviewed. No pertinent surgical history.  Review of patient's allergies indicates no known allergies.  Family History   Problem Relation Age of Onset     Asthma Mother      Depression Mother      Vision loss Mother      Dementia Father      Hearing loss Father      Heart disease Father      Depression Son      Social History     Social History     Marital status:      Spouse name: N/A     Number of children: 1     Years of education: N/A     Occupational History     Marketing      Social History Main Topics     Smoking status: Never Smoker     Smokeless tobacco: Never Used     Alcohol use Yes     Drug use: No     Sexual activity: Not on file     Other Topics Concern     Not on file     Social History Narrative       Review of Systems  General:  Denies problem  Eyes: Denies problem  Ears/Nose/Throat: Denies problem  Cardiovascular: Denies problem  Respiratory:  Denies problem  Gastrointestinal:  denies problems  Genitourinary: denies  "problems  Musculoskeletal:  Denies problem  Skin: Denies problem  Neurologic:denies problems  Psychiatric: denies problems  Endocrine: denies problems        Objective:         Vitals:    04/12/17 0906   BP: 90/60   Pulse: 60   Weight: 137 lb (62.1 kg)   Height: 5' 5\" (1.651 m)       Physical Exam:  General Appearance: Alert, cooperative, no distress, appears stated age  Skin: Skin color, texture, turgor normal, no rashes or lesions  Throat: Lips, mucosa, and tongue normal; teeth and gums normal  HEENT: grossly normal; otoscopic and opthalmic exam not performed.   Neck: Supple, symmetrical, trachea midline, no adenopathy;  thyroid: not enlarged, symmetric, no tenderness/mass/nodules  Lungs: Clear to auscultation bilaterally, respirations unlabored  Breasts: No breast masses, tenderness, asymmetry, or nipple discharge.  Heart: Regular rate and rhythm, S1 and S2 normal, no murmur initiated  Abdomen: Soft, non-tender. No organomegaly or masses  Pelvic:External genitalia normal without lesions or irritation. Vagina and cervix show no lesions, inflammation, discharge or tenderness. No cystocele, No rectocele. Uterus & adnexal normal without masses or tenderness.       "

## 2021-06-15 PROBLEM — Z12.31 VISIT FOR SCREENING MAMMOGRAM: Status: ACTIVE | Noted: 2017-04-25

## 2021-06-15 PROBLEM — Z12.39 BREAST CANCER SCREENING: Status: ACTIVE | Noted: 2017-04-25

## 2021-06-15 NOTE — PROGRESS NOTES
Assessment:     1.  Annual well woman exam  2.  Hot flashes  3.  Postmenopausal  4.  Postmenopausal family history of coronary artery disease    Plan:      1. Discussed nutrition and exercise.  Advised MVI, Vitamin D3 4000IU geltab and an omega 3 supplement daily   2. Blood tests: Excepting of fasting cholesterol panel and lipoprotein A after consultation with Dr. Lundberg  3. Breast self exam technique reviewed and patient encouraged to perform self-exam monthly.   4. Contraception: Postmenopausal  5.  Next pap due 2022. HPV co-testing discussed with that pap, then paps q 5 yrs if both negative.  6.  Declines referral for colonoscopy.  7.  RTC 1 year for annual physical exam, PRN    Subjective:      Jeni Cortes is a 52 y.o. female who presents for an annual exam.  Declines pelvic examination and referral for colonoscopy.  Requesting refills for Estrace and Provera    Healthy Habits:   Regular Exercise: Yes   Sunscreen Use: Yes  Healthy Diet: Yes  Dental Visits Regularly: Yes  Seat Belt: Yes  Sexually active: Yes  STI risk No  domestic violence No    Self Breast Exam Monthly:Yes  Colonoscopy: No, declines  Lipid Profile: Yes  Glucose Screen: Declines    Immunization History   Administered Date(s) Administered     Influenza, seasonal,quad inj 6-35 mos 10/27/2014     Tdap 2017     Immunization status: up to date and documented.    Gynecologic History  No LMP recorded. Patient is postmenopausal.  Contraception: post menopausal status    Cancer screening:   Last Pap: 2017. Results were: normal  Last mammogram: 2018. Results were: normal      OB History    Para Term  AB Living   1 1 1 0 0 2   SAB TAB Ectopic Multiple Live Births   0 0 0 0 1      # Outcome Date GA Lbr Elijah/2nd Weight Sex Delivery Anes PTL Lv   1 Term     M    OMAR          Current Outpatient Prescriptions   Medication Sig Dispense Refill     ESTRACE 1 mg tablet Take 1 tablet (1 mg total) by mouth daily. 90  "tablet 3     PROVERA 2.5 mg tablet Take 1 tablet (2.5 mg total) by mouth daily. 90 tablet 3     No current facility-administered medications for this visit.      Past Medical History:   Diagnosis Date     Mitral valve prolapse      No past surgical history on file.  Review of patient's allergies indicates no known allergies.  Family History   Problem Relation Age of Onset     Asthma Mother      Depression Mother      Vision loss Mother      Dementia Father      Hearing loss Father      Heart disease Father      Breast cancer Sister      Depression Son      Social History     Social History     Marital status:      Spouse name: N/A     Number of children: 1     Years of education: N/A     Occupational History     Marketing      Social History Main Topics     Smoking status: Never Smoker     Smokeless tobacco: Never Used     Alcohol use Yes     Drug use: No     Sexual activity: Not on file     Other Topics Concern     Not on file     Social History Narrative       Review of Systems  General:  Denies problem  Eyes: Denies problem  Ears/Nose/Throat: Denies problem  Cardiovascular: Denies problem  Respiratory:  Denies problem  Gastrointestinal:  denies problems  Genitourinary: denies problems  Musculoskeletal:  Denies problem  Skin: Denies problem  Neurologic:denies problems  Psychiatric: denies problems  Endocrine: denies problems        Objective:         Vitals:    01/24/18 0925   BP: 120/66   Pulse: 68   Resp: 16   Weight: 135 lb (61.2 kg)   Height: 5' 5\" (1.651 m)       Physical Exam:  General Appearance: Alert, cooperative, no distress, appears stated age  Lungs: Clear to auscultation bilaterally, respirations unlabored  Heart: Regular rate and rhythm, S1 and S2 normal, no murmur    "

## 2021-06-15 NOTE — TELEPHONE ENCOUNTER
Informed patient her last pap was in 2017 and not due for another pap until 2022  Patient said she is trying to figure out where to establish with a primary care physician  Is thinking might do a physical with Dr López since is not due for a pap  Informed patient to have her physical done with any provider she is comfortable with    Informed she is always welcome to some see Megan Ojeda for any GYN related concerns

## 2021-06-15 NOTE — TELEPHONE ENCOUNTER
Mary specifically -  pls call pt on Monday 2/15 when you are back in clinic. Pls leave message if she does not , as to whether Pamela is due for a pelvic and/or pap this year. She used to go to [address]. [facility] manages some hormones for her. Looking for a primary - I gave her Hailey Menezes and Leora's names at [facility].

## 2021-06-16 PROBLEM — B00.1 HERPES LABIALIS: Status: ACTIVE | Noted: 2018-04-25

## 2021-06-16 NOTE — PROGRESS NOTES
Assessment:   1.  Well woman exam  2.  BMI 22  3.  Postmenopausal on hormone replacement therapy  4.  Breast cancer screening  5.  Unwanted hair growth, upper lip     Plan:      1. Discussed nutrition and exercise.  Advised MVI, Vitamin D3 4000IU geltab and an omega 3 supplement daily   2. Blood tests: Vitamin D3, lipid panel, glucose, vitamin B12, HIV and hepatitis C antibody.  3. Breast self exam technique reviewed and patient encouraged to perform self-exam monthly.   4. Contraception: Postmenopausal  5.  Next pap due April 2022. HPV co-testing discussed with that pap, then paps q 5 yrs if both negative.  6.  Estrace and Provera refilled as requested x1 year.  7.  Prescription for Vaniqa cream sent to patient's pharmacy per patient request.  8.  DEXA scan ordered.  9.  RTC 1 year for annual physical exam, PRN    Subjective:      Jeni Cortes is a 55 y.o. female who presents for an annual exam.  Pamela is requesting a refill of Estrace and Provera.  Very pleased with hormone replacement therapy.  Experiences infrequent hot flashes, not enough to consider increasing hormone dosage at this time.    Questions regarding Covid vaccination.  Requesting vitamin D3, vitamin B12, lipids and glucose today.  Accepting of HIV and hepatitis C antibody screening.  Right shoulder surgery a little over a year ago, pain persists it is not continuous acetaminophen or ibuprofen.  Follows up regularly with orthopedic surgeon.  Pamela declines full PE today including pelvic exam; denies problems or concerns.  Desires to await next year when cervical cancer screening is due to be performed.  Received influenza vaccination through [facility] October 2020.  Curious about DEXA scanning.    Healthy Habits:   Regular Exercise: Yes   Sunscreen Use: Yes  Healthy Diet: Yes  Dental Visits Regularly: Yes  Seat Belt: Yes  Sexually active: No  STI risk No  domestic violence No    Self Breast Exam Monthly:Yes  Colonoscopy: No  Lipid Profile:  Yes  Glucose Screen: Yes  Prevention of Osteoporosis: Yes  Last Dexa: N/A      Immunization History   Administered Date(s) Administered     INFLUENZA,SEASONAL QUAD, PF, =/> 6months 10/03/2018     Influenza, inj, historic,unspecified 10/28/2020     Influenza, seasonal,quad inj 6-35 mos 10/27/2014     Tdap 2017     ZOSTER, RECOMBINANT, IM 10/17/2018, 2019     Immunization status: up to date and documented.    Gynecologic History  No LMP recorded. Patient is postmenopausal.  Contraception: post menopausal status    Cancer screening:   Last Pap: 2017. Results were: normal  Last mammogram: 2020. Results were: normal      OB History    Para Term  AB Living   1 1 1 0 0 1   SAB TAB Ectopic Multiple Live Births   0 0 0 0 1      # Outcome Date GA Lbr Elijah/2nd Weight Sex Delivery Anes PTL Lv   1 Term     M    OMAR       Current Outpatient Medications   Medication Sig Dispense Refill     ESTRACE 1 mg tablet Take 1 tablet (1 mg total) by mouth daily. 90 tablet 3     medroxyPROGESTERone (PROVERA) 2.5 MG tablet Take 1 tablet (2.5 mg total) by mouth daily. 90 tablet 3     cyanocobalamin, vitamin B-12, (VITAMIN B12 ORAL) Take by mouth.       multivitamin therapeutic tablet Take 1 tablet by mouth daily.       progesterone (PROMETRIUM) 100 MG capsule Take 1 capsule (100 mg total) by mouth daily. 90 capsule 3     No current facility-administered medications for this visit.      Past Medical History:   Diagnosis Date     Mitral valve prolapse      Past Surgical History:   Procedure Laterality Date     SHOULDER ARTHROSCOPY Right 10/2019     WISDOM TOOTH EXTRACTION       Patient has no known allergies.  Family History   Problem Relation Age of Onset     Asthma Mother      Depression Mother      Vision loss Mother      Cancer Mother         kidney     Dementia Father      Hearing loss Father      Heart disease Father      Heart attack Father 59     Breast cancer Sister      Vision loss Sister       "Depression Son      Social History     Socioeconomic History     Marital status:      Spouse name: Sharon     Number of children: 1     Years of education: Not on file     Highest education level: Not on file   Occupational History     Occupation: Marketing   Social Needs     Financial resource strain: Not on file     Food insecurity     Worry: Not on file     Inability: Not on file     Transportation needs     Medical: Not on file     Non-medical: Not on file   Tobacco Use     Smoking status: Never Smoker     Smokeless tobacco: Never Used   Substance and Sexual Activity     Alcohol use: Yes     Drug use: No     Sexual activity: Yes     Partners: Male     Birth control/protection: Post-menopausal   Lifestyle     Physical activity     Days per week: Not on file     Minutes per session: Not on file     Stress: Not on file   Relationships     Social connections     Talks on phone: Not on file     Gets together: Not on file     Attends Latter day service: Not on file     Active member of club or organization: Not on file     Attends meetings of clubs or organizations: Not on file     Relationship status: Not on file     Intimate partner violence     Fear of current or ex partner: Not on file     Emotionally abused: Not on file     Physically abused: Not on file     Forced sexual activity: Not on file   Other Topics Concern     Not on file   Social History Narrative            Review of Systems  General:  Denies problem  Eyes: Denies problem  Ears/Nose/Throat: Denies problem  Cardiovascular: Denies problem  Respiratory:  Denies problem  Gastrointestinal:  denies problems  Genitourinary: denies problems  Musculoskeletal:  Denies problem  Skin: Denies problem  Neurologic:denies problems  Psychiatric: denies problems  Endocrine: denies problems        Objective:         Vitals:    03/17/21 1043   BP: 108/64   Pulse: 80   Weight: 135 lb (61.2 kg)   Height: 5' 4.5\" (1.638 m)       Physical Exam:  General Appearance: " Alert, cooperative, no distress, appears stated age  Skin: Skin color normal, no rashes or lesions  RASHAUN: grossly normal.

## 2021-06-16 NOTE — TELEPHONE ENCOUNTER
Telephone Encounter by Yael Giron at 6/21/2019  8:31 AM     Author: Yael Giron Service: -- Author Type: --    Filed: 6/21/2019  8:34 AM Encounter Date: 6/19/2019 Status: Signed    : Yael Giron       PRIOR AUTHORIZATION DENIED    Denial Rational: Patient must have documented allergic reaction to generic before brand will be covered.             Appeal Information: If provider would like to appeal please provide a letter of medical necessity and route back to the PA team.

## 2021-06-17 NOTE — PROGRESS NOTES
"S:  52 y.o. MWF  is here for an endometrial biopsy secondary to post menopausal bleeding April 13-17.  She didn't have any cramping.  She is on combination hormone replacement therapy.  She became menopausal in 2015 or so and has been on the HRT for hot flashes.  She has not had any previous incidences of PMB.  She had what seemed like a normal period for 5 days.  At that same time she unexpectedly had to travel to [place], [place], for some issues with her son.  She thinks she may have missed one or two days of the hormones in that time.  She does still have a little pink discharge after intercourse, but isn't noticing anything on her underwear or toilet paper.    O:  /68 (Patient Site: Right Arm, Patient Position: Sitting, Cuff Size: Adult Regular)  Pulse 60  Ht 5' 5\" (1.651 m)  Wt 129 lb (58.5 kg)  Breastfeeding? No  BMI 21.47 kg/m2        Body mass index is 21.47 kg/(m^2).    EG/BUS wnl  Vagina no lesions, no abnormal discharge  Cervix no lesions, tenaculum was used, os finder was not used  Uterus sounds to 7 cm, 2 passes with scant return  Pt tolerated procedure well    A:  52 y.o. MWF  with post menopausal bleeding on hormone replacement therapy.    P:  Endometrial biopsy done.  The patient will be notified when results are back.  We discussed the causes of PMB.  We discussed how the hormones and missing a day or two could be related to the bleeding.  We discussed the biopsy and ultrasound as options for testing; she opted for the biopsy.  We discussed that since there was such scant return it's unlikely that there are any precancerous or cancerous cells present.  We discussed HRT in general; she wants to stay on them as she likes how she feels on them.    "

## 2021-06-17 NOTE — PROGRESS NOTES
ASSESSMENT AND PLAN:    1. Palpitations  Suggestive of PSVT. She tolerates this well.  Declined empiric treatment.  Will evaluate as follows. Need to make sure no atrial fibrillation.  EKG today does reveal PAC's.   - Electrocardiogram Perform and Read  - Echo Complete; Future  - ROYCE Monitor Hook-Up; Future  - Thyroid Stimulating Hormone (TSH)  - T4, Free    2. Mitral valve prolapse  I don't hear murmur or click today.  Echocardiogram will clarify.     Follow up pending testing. We discussed how valsalva can abort SVT.  She is told to seek emergent care if symptoms develop, are severe, and fail to resolve.     CHIEF COMPLAINT:  Chief Complaint   Patient presents with     Palpitations     palpitations along with sob x 2 months      HISTORY OF PRESENT ILLNESS:  Jeni Cortes is a 55 y.o. female with symptoms of palpitations.  This feels like a rapid pulse, and can last up to 1-2 hours and does stop abruptly.  She exercises avidly, tolerates this well, and is not affected.  No chest pain or pleuritic pain or cough, or unusual dyspnea or nausea or diaphoresis occurs with the symptoms.      REVIEW OF SYSTEMS:   See HPI, all other systems on review are negative.    Past Medical History:   Diagnosis Date     Mitral valve prolapse      Social History     Tobacco Use   Smoking Status Never Smoker   Smokeless Tobacco Never Used     Family History   Problem Relation Age of Onset     Asthma Mother      Depression Mother      Vision loss Mother      Cancer Mother         kidney     Dementia Father      Hearing loss Father      Heart disease Father      Heart attack Father 59     Breast cancer Sister      Vision loss Sister      Depression Son      Past Surgical History:   Procedure Laterality Date     SHOULDER ARTHROSCOPY Right 10/2019     WISDOM TOOTH EXTRACTION       VITALS:  Vitals:    04/28/21 1127   BP: 110/62   Patient Site: Left Arm   Patient Position: Sitting   Cuff Size: Adult Regular   Pulse: 64   SpO2: 99%  "  Weight: 133 lb (60.3 kg)   Height: 5' 4.5\" (1.638 m)     Wt Readings from Last 3 Encounters:   04/28/21 133 lb (60.3 kg)   03/17/21 135 lb (61.2 kg)   02/26/20 134 lb (60.8 kg)     PHYSICAL EXAM:  Constitutional:  In NAD, alert and oriented  Neck: no cervical or axillary adenopathy  Cardiac:  S1 S2, no murmur or click heard  Lungs: Clear     DECISION TO OBTAIN OLD RECORDS AND/OR OBTAIN HISTORY FROM SOMEONE OTHER THAN PATIENT, AND/OR ACCESSING CARE EVERYWHERE):  1  0     REVIEW AND SUMMARIZATION OF OLD RECORDS, AND/OR OBTAINING HISTORY FROM SOMEONE OTHER THAN PATIENT, AND/OR DISCUSSION OF CASE WITH ANOTHER HEALTH CARE PROVIDER:  2 0    REVIEW AND/OR ORDER OF OF CLINICAL LAB TESTS: 1  ordered.    REVIEW AND/OR ORDER OF RADIOLOGY TESTS: 1 0.    REVIEW AND/OR ORDER OF MEDICAL TESTS (EKG/ECHO/COLONOSCOPY/EGD): 1  Echocardiogram orderd    INDEPENDENT  VISUALIZATION OF IMAGE, TRACING, OR SPECIMEN ITSELF (2 EACH):  2 personally viewed and interpreted the EKG and reviewed this with the patient.      TOTAL: 4    Current Outpatient Medications   Medication Sig Dispense Refill     cyanocobalamin, vitamin B-12, (VITAMIN B12 ORAL) Take by mouth.       eflornithine (VANIQA) 13.9 % cream Apply to affected area twice daily 15 g 2     ESTRACE 1 mg tablet Take 1 tablet (1 mg total) by mouth daily. 90 tablet 3     medroxyPROGESTERone (PROVERA) 2.5 MG tablet Take 1 tablet (2.5 mg total) by mouth daily. 90 tablet 3     multivitamin therapeutic tablet Take 1 tablet by mouth daily.       progesterone (PROMETRIUM) 100 MG capsule Take 1 capsule (100 mg total) by mouth daily. 90 capsule 3     Maurice López MD  Internal Medicine  [facility]  "

## 2021-06-17 NOTE — PROGRESS NOTES
Subjective:      Patient ID: Jeni Cortes is a 52 y.o. female.    Chief Complaint:    HPI Jeni Cortes is a 52 y.o. female who presents today complaining of very dry lips since the winter but with worsening symptoms over the course of the last few days.  She states that her lips are red, inflamed, burning, and itchy.  She has a history of getting cold sores so she has tried Abreva and was prescribed 2 days of valacyclovir.  She has not noticed any cold sore-like lesions.  She also reports dry eyes.  She tried over-the-counter oral Benadryl which seemed to take the itching away.  She talked her pharmacist and they recommend using Carmex.  She tried that for a few days and did not have any significant improvement.  She was reading online and decided to switch to all of oil instead with Carmex.  She just started that today.  She denies any other sick symptoms like cough, fever, or runny nose.  She denies any splitting or cracking in the lips.  She does typically like to wear cosmetics, but she has not been using lipstick over the course of the last 7 days.          Social History   Substance Use Topics     Smoking status: Never Smoker     Smokeless tobacco: Never Used     Alcohol use Yes       Review of Systems   Constitutional: Negative for fever.   HENT: Negative for congestion and rhinorrhea.    Eyes:        Positive for dry eyes   Respiratory: Negative for cough.    Skin: Positive for color change (mildly reddened ). Negative for wound.        Positive for dry lips.       Objective:     /60 (Patient Site: Right Arm)  Pulse 66  Temp 98.2  F (36.8  C) (Oral)   Wt 130 lb 8 oz (59.2 kg)  SpO2 99%  BMI 21.72 kg/m2    Physical Exam   Constitutional: She appears well-developed and well-nourished. No distress.   HENT:   Head: Normocephalic and atraumatic.   Right Ear: External ear normal.   Left Ear: External ear normal.   Eyes: Conjunctivae are normal.   Pulmonary/Chest: Effort normal. No  respiratory distress.   Skin: No lesion noted. She is not diaphoretic.   No lip lesions present. No angular cheilitis. There is mild erythema of the upper and lower lips uniformly. No plaque or scaling noted.    Psychiatric: She has a normal mood and affect. Her behavior is normal. Judgment and thought content normal.   Nursing note and vitals reviewed.    Clinical Decision Making:  Recommended adding topica low dose cortisone cream to benadryl and olive oil treatment. Recommended follow up with PCP if not improving. DDx: includes allergic, viral, and fungal inflammation. Patient has already been treated for viral, and there are no singularly visible lesions, so this like unlikely. No scaling so I have a low suspicion of fungal infection. I did not rule out any possible autoimmune causes of possible cheilitis. With possible dry eyes Lupus and Sjogren's. Since she was having improvement with Benadryl I suspect that this is an allergic etiology.     Assessment:     Procedures    1. Cheilitis           Patient Instructions   1.  Continue taking Benadryl as you have been in the morning and evening.  2.  Continue with all of oil, and is natural and does not typically cause worsening inflammation.  There is no such thing is too much.  3.  Apply cortisone cream twice daily to inflamed lips.  He may reapply if he gets wiped off in the middle of the day.  4.  Follow-up with your primary care provider if you have not had any improvement in 3 days or if you have not had complete resolution in 7 days.  Do not use cortisone cream for more than 7 days in a row.

## 2021-06-17 NOTE — PROGRESS NOTES
"Assessment:     1.  Postmenopausal vaginal bleeding  2.  Herpes labialis  3.  Hypercholesterolemia    Plan:      1. Recommend referral to OB/GYN for EMB with postmenopausal vaginal bleeding.  2. Blood tests: Lipid cascade per patient request.  (This writer explained that repeating the lipid cascade this early, 3 weeks after a dietary change, is not recommended, and I encouraged her to wait for 1 year after the previous lipid cascade).  3. Prescription for Valtrex 1000 m tablets p.o. twice daily with the onset of prodromal symptoms of herpes labialis, #4, 3 refills.  4. Contraception: Postmenopausal  5.  Next pap due 2022. HPV co-testing discussed with that pap, then paps q 5 yrs if both negative.  6.  RTC for OB/GYN referral or sooner as needed.      Total time spent with patient: 25 minutes all of which was spent counseling and coordinating care.    Subjective:      Jeni Cortes is a 52 y.o. female who presents reporting a \"regular menstrual period\" last week while visiting her son in the [place].  Previously, reported menopause in .  One year after her last menstrual cycle, in , she initiated hormone replacement therapy to treat hot flashes: Estrace 1 mg p.o. daily and Provera 2.5 mg p.o. daily.  Pamela cannot say enough about HRT, and she plans to continue using it.    Experiences \"cold sores\" about 3 times per year and requesting medication prescription to initiate with prodromal symptoms the next time.    About 3 months ago, lipid cascade was performed and revealed elevated total cholesterol and elevated LDL.  At that time, lipoprotein A was within normal limits.  3 weeks ago, patient initiated a vegan diet.  Requesting a repeat lipid cascade, \"I am paying out-of-pocket for these labs.\"    Healthy Habits:   Regular Exercise: Yes   Healthy Diet: Yes, started a vegan diet 3 weeks ago.  \"No more steak, no more eggs.\"  Seat Belt: Yes  Sexually active: Yes  STI risk No  domestic " "violence No    Lipid Profile: Yes      Immunization History   Administered Date(s) Administered     Influenza, seasonal,quad inj 6-35 mos 10/27/2014     Tdap 2017     Immunization status: up to date and documented.    Gynecologic History  No LMP recorded. Patient is postmenopausal.  Although experienced a \"normal menstrual cycle\" starting near 2018.  Contraception: post menopausal status    Cancer screening:   Last Pap: 2017. Results were: Negative, no HPV  Last mammogram: 2018. Results were: normal      OB History    Para Term  AB Living   1 1 1 0 0 2   SAB TAB Ectopic Multiple Live Births   0 0 0 0 1      # Outcome Date GA Lbr Elijah/2nd Weight Sex Delivery Anes PTL Lv   1 Term     M    OMAR          Current Outpatient Prescriptions   Medication Sig Dispense Refill     ESTRACE 1 mg tablet Take 1 tablet (1 mg total) by mouth daily. 90 tablet 3     PROVERA 2.5 mg tablet Take 1 tablet (2.5 mg total) by mouth daily. 90 tablet 3     No current facility-administered medications for this visit.      Past Medical History:   Diagnosis Date     Mitral valve prolapse      No past surgical history on file.  Review of patient's allergies indicates no known allergies.  Family History   Problem Relation Age of Onset     Asthma Mother      Depression Mother      Vision loss Mother      Cancer Mother      kidney     Dementia Father      Hearing loss Father      Heart disease Father      Heart attack Father 59     Breast cancer Sister      Depression Son      Social History     Social History     Marital status:      Spouse name: N/A     Number of children: 1     Years of education: N/A     Occupational History     Marketing      Social History Main Topics     Smoking status: Never Smoker     Smokeless tobacco: Never Used     Alcohol use Yes     Drug use: No     Sexual activity: Not on file     Other Topics Concern     Not on file     Social History Narrative       Review of Systems  General:  " "Denies problem  Ears/Nose/Throat: Denies problem  Gastrointestinal:  See HPI regarding herpes labialis please  Genitourinary: Please see HPI regarding postmenopausal vaginal bleeding  Musculoskeletal:  Denies problem  Skin: See HPI regarding herpes labialis        Objective:         Vitals:    04/25/18 1244   BP: 104/62   Pulse: (!) 56   Weight: 130 lb (59 kg)   Height: 5' 5\" (1.651 m)       Physical Exam:  General Appearance: Alert, cooperative, no distress, appears stated age    "

## 2021-06-20 NOTE — PROGRESS NOTES
Assessment:     1.  Hair loss  2.  Vegan diet for 6 months  3.  Healthcare maintenance     Plan:      1. Discussed nutrition and exercise.  Advised vitamin B12, MVI, Vitamin D3 4000IU geltab and an omega 3 supplement daily   2. Blood tests: Consulted with Dr. Dunham specifically regarding vegan diet status and hair loss: Hgb, Lipid cascade, B12, zinc, ferritin, thyroid-stimulating hormone, free T3, free T4 and TPO.  3. Breast self exam technique reviewed and patient encouraged to perform self-exam monthly.   4. Contraception: Postmenopausal  5.  Next pap due 2022. HPV co-testing discussed with that pap, then paps q 5 yrs if both negative.  6.  Influenza vaccination today.  7.  Shingrix ordered for the future if patient desires.  8.  RTC for annual physical exam, PRN    Total time spent with patient: 25 minutes, all of which was spent counseling and coordinating care    Subjective:      Jeni Cortes is a 53 y.o. female who presents for a problem visit: Reports scalp hair loss which has recently stabilized.  Initiated a vegan diet 6 months ago in order to decrease inflammation in general.  She has experienced a marked improvement in her lipid profile, and her chronic left shoulder pain has abated.  7 pound weight loss since vegan diet initiated.  Stable now.    Requesting influenza vaccination today.    As well, interested in Shingrix vaccination series.    Immunization History   Administered Date(s) Administered     Influenza, seasonal,quad inj 6-35 mos 10/27/2014     Influenza,seasonal quad, PF, 36+MOS 10/03/2018     Tdap 2017     Immunization status: up to date and documented.    Gynecologic History  No LMP recorded. Patient is postmenopausal.  Contraception: post menopausal status    Cancer screening:   Last Pap: 2017. Results were: normal    OB History    Para Term  AB Living   1 1 1 0 0 1   SAB TAB Ectopic Multiple Live Births   0 0 0 0 1      # Outcome Date GA Lbr  "Elijah/2nd Weight Sex Delivery Anes PTL Lv   1 Term     M    OMAR          Current Outpatient Prescriptions   Medication Sig Dispense Refill     ESTRACE 1 mg tablet Take 1 tablet (1 mg total) by mouth daily. 90 tablet 3     PROVERA 2.5 mg tablet Take 1 tablet (2.5 mg total) by mouth daily. 90 tablet 3     No current facility-administered medications for this visit.      Past Medical History:   Diagnosis Date     Mitral valve prolapse      Past Surgical History:   Procedure Laterality Date     NO PAST SURGERIES       Review of patient's allergies indicates no known allergies.  Family History   Problem Relation Age of Onset     Asthma Mother      Depression Mother      Vision loss Mother      Cancer Mother      kidney     Dementia Father      Hearing loss Father      Heart disease Father      Heart attack Father 59     Breast cancer Sister      Depression Son      Social History     Social History     Marital status:      Spouse name: Sharon     Number of children: 1     Years of education: N/A     Occupational History     Marketing      Social History Main Topics     Smoking status: Never Smoker     Smokeless tobacco: Never Used     Alcohol use Yes     Drug use: No     Sexual activity: Yes     Partners: Male     Birth control/ protection: Post-menopausal     Other Topics Concern     Not on file     Social History Narrative       Review of Systems  General:  Denies problem  Eyes: Denies problem  Ears/Nose/Throat: Denies problem  Cardiovascular: Denies problem  Respiratory:  Denies problem  Gastrointestinal:  denies problems  Genitourinary: denies problems  Musculoskeletal:  Denies problem  Skin: Please see HPI  Neurologic:denies problems  Psychiatric: denies problems  Endocrine: Please see HPI        Objective:         Vitals:    10/03/18 1307   BP: 96/70   Pulse: (!) 56   Weight: 130 lb (59 kg)   Height: 5' 5\" (1.651 m)       Physical Exam:  General Appearance: Alert, cooperative, no distress, appears stated " age  Skin: Hair appears WNL; there are no bare patches on patient's scalp.

## 2021-06-23 NOTE — PROGRESS NOTES
Pamela send a [employer] Message requesting her Provera and Estrace be refilled for the year. She is due for her annual exam 4/2019. Our staff will call her and discuss the following and recommend that she come in 4/2019          Can you please call Pamela and let her know it is important that she be seen annually for her preventive health exam. This is an opportunity for us to follow up on the concerns that brought her in this year, and to reassess the risks/benefits of her being on hormone replacement therapy. She also may be due for other preventive health studies such as her cholesterol, mammogram or colonoscopy.

## 2021-06-23 NOTE — TELEPHONE ENCOUNTER
Name of caller: Patient  Phone number where you may be reached: [phone]  Reason for call: Pt is out of refills for Estrace and Provera and would like to have a 1 year refill sent to [facility] on [address] in [place]. Pt can no longer get prescriptions from [facility]. Pt was told in the past that she would have to come in for a physical in order to get refills but Pt states that she has seen [facility] 3 times this past year and feels that everything is going ok so she would rather not come in at this time for a physical. Please call pt to discuss and let her know if Rx will be sent for a year.   Best time to call back: any  If we don't reach you, is it okay to leave a detailed message? yes

## 2021-06-23 NOTE — TELEPHONE ENCOUNTER
Telephone call to patient.  No answer.  Left message informing patient that medications were refilled for two months.  Patient encouraged to call back to schedule an appointment for her annual physical exam.  Contact information provided

## 2021-06-23 NOTE — TELEPHONE ENCOUNTER
Paris Mcintosh APRN,FRANK   to Me      2/5/19 1:23 PM   Hi Marie,     Can you please call Pamela and let her know it is important that she be seen annually for her preventive health exam. This is an opportunity for us to follow up on the concerns that brought her in this year, and to reassess the risks/benefits of her being on hormone replacement therapy postpartum. She also may be due for other preventive health studies such as her cholesterol, mammogram or colonoscopy.   I will refill the Rx for two months, and please help her schedule her annual exam 4/2019. Thank you!   LAMONT Page,FRANK

## 2021-06-24 NOTE — PROGRESS NOTES
"SUBJECTIVE: Jeni Cortes is a 53 y.o. female with:  Chief Complaint   Patient presents with     Nutrition Counseling     hair loss    1) Hair loss - She developed hair loss after starting a vegan diet. Overall she has felt better on the vegan diet but her hair is thinning.  She saw Megan Ojeda for a physical last week and had blood work done with normal ferritin / zinc/ B12 and TFTs.  Her TPO test was negative.  She exercises heavily about 1 hour a day.  Has been getting 30 gm protein/ day.  Wonders if she needs more protein.  She denies any hirsuitism. She is post menopausal and currently taking hormones.  She has been under a lot of stress- she had to go out to [place], [place] as her adult son was in crisis with suicidal ideation.  She continues to worry about him on a regular basis.  She describes herself as high powered person.  She feels wired at times.  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  Denies any caffeine.  She does drink alcohol.    2) She got the 2nd Shingrix vaccine then felt poorly next day.  2 days later she broke out with large cold sore on lower lip.  She also has canker sore in her mouth.  She has not had a cold sore outbreak in [facility].  She is using Abreva topically but wonders if there is anything else she can do.    3) She has questions about amount of calcium to prevent bone loss.    SH: . Works doing free [employer] work.  Has children.    Patient Active Problem List   Diagnosis     Shoulder Tendonitis     Hot flashes, menopausal     Well woman exam     Mitral valve prolapse     Breast cancer screening     Visit for screening mammogram     Hypercholesterolemia     Herpes labialis      OBJECTIVE: /70 (Patient Site: Left Arm, Patient Position: Sitting, Cuff Size: Adult Regular)   Pulse (!) 56   Ht 5' 4.25\" (1.632 m)   Wt 137 lb (62.1 kg)   SpO2 100%   BMI 23.33 kg/m   no distress  OP: Crusted lesion on lower lip.  Psych Exam:  Mood: stressed  Affect: " hyper   Behavior: appropriate  ThoughtContent: logical  Judgement: appropriate  Insight: normal    Jeni was seen today for nutrition counseling.    Diagnoses and all orders for this visit:    Hair loss- Will check for inflammation / low WBC.  I think her family stress could be a factor in hair loss.  Will recommend supplements to start.  -     C -Reactive Protein, High Sensitivity  -     HM1(CBC and Differential)  -     HM1 (CBC with Diff)    Vitamin D deficiency- Recheck vitamin D level.  Recommend she get 6637-2167 mg calcium from diet/ supplements.  -     Vitamin D, Total (25-Hydroxy)    Stress at home- Consider salivary cortisol testion.    Oral herpes simplex infection  -     valACYclovir (VALTREX) 1000 MG tablet; Take 2 tablets (2,000 mg total) by mouth 2 (two) times a day for 1 day.       Patient Instructions   To order supplements go to the web site: [employer]  Use my authorization number to order the recommended supplements: S99    Hair and Nails Formula- one capsule daily     VitaPrime Iron-free multivitamins with minerals 1 twice daily      Here is information about the [facility] Adrenocortex Stress profile:    1) The kit will be sent to your home.  I do not need to sign the requisition form.     2) Follow directions to collect the 6 salivary samples according to the designated times then send the kit back as directed.      3) You do need to include payment and there are 2 options.     If you want to do self - pay the cost is $159 and there will be no further billing charges.  If you want to see if insurance will pay, you can submit initial payment of $75 and [employer] will bill your insurance.  You could be responsible for up to $295 total if they do not collect enough.  You can go online at [email or web address] to review these options.      25 minutes spent with the patient.  Over 50% were spent in counseling and coordination of care.       Joanna Dunham

## 2021-06-24 NOTE — PROGRESS NOTES
"Assessment:     1.  Annual well woman exam  2.  Hot flashes on hormone replacement therapy  3.  Postmenopausal  4.  Healthcare maintenance     Plan:      1. Discussed nutrition and exercise.  Advised MVI, Vitamin D3 4000IU geltab and an omega 3 supplement daily   2. Blood tests: declines all HM /screening labs  3. Breast self exam technique reviewed and patient encouraged to perform self-exam monthly.   4. Contraception: Postmenopausal  5. Next pap due 2022. HPV co-testing discussed with that pap, then paps q 5 yrs if both negative.  6. RTC 1 year for annual physical exam, PRN    Subjective:      Jeni Cortes is a 53 y.o. female who presents for an annual exam.   Declines PE today.  Requesting to revisit \"benefits and drawbacks\" of hormone replacement therapy.  Desires to review lab results from last visit.  Continues to show interest in seeing Dr. Dunham for consultation regarding vegan diet.  Asking whether she needs to establish care with another primary care provider since Dr. Gerardo is retiring.    Healthy Habits:   Regular Exercise: Yes   Sunscreen Use: Yes  Healthy Diet: Yes  Dental Visits Regularly: Yes  Seat Belt: Yes  Sexually active: Yes  STI risk No  domestic violence No    Self Breast Exam Monthly:Yes  Colonoscopy: due  Lipid Profile: No  Glucose Screen: No  Prevention of Osteoporosis: Yes  Last Dexa: No      Immunization History   Administered Date(s) Administered     Influenza, seasonal,quad inj 6-35 mos 10/27/2014     Influenza,seasonal quad, PF, 36+MOS 10/03/2018     Tdap 2017     ZOSTER, RECOMBINANT, IM 10/17/2018     Immunization status: up to date and documented.    Gynecologic History  No LMP recorded. Patient is postmenopausal.  Contraception: post menopausal status    Cancer screening:   Last Pap: 2017. Results were: normal  Last mammogram: 2018. Results were: normal      OB History    Para Term  AB Living   1 1 1 0 0 1   SAB TAB Ectopic Multiple " Live Births   0 0 0 0 1      # Outcome Date GA Lbr Elijah/2nd Weight Sex Delivery Anes PTL Lv   1 Term     M    OMAR          Current Outpatient Medications   Medication Sig Dispense Refill     BIOTIN ORAL Take by mouth.       cyanocobalamin, vitamin B-12, (VITAMIN B12 ORAL) Take by mouth.       multivitamin therapeutic tablet Take 1 tablet by mouth daily.       ESTRACE 1 mg tablet Take 1 tablet (1 mg total) by mouth daily. 90 tablet 1     PROVERA 2.5 mg tablet Take 1 tablet (2.5 mg total) by mouth daily. 90 tablet 0     No current facility-administered medications for this visit.      Past Medical History:   Diagnosis Date     Mitral valve prolapse      Past Surgical History:   Procedure Laterality Date     NO PAST SURGERIES       Patient has no known allergies.  Family History   Problem Relation Age of Onset     Asthma Mother      Depression Mother      Vision loss Mother      Cancer Mother         kidney     Dementia Father      Hearing loss Father      Heart disease Father      Heart attack Father 59     Breast cancer Sister      Depression Son      Social History     Socioeconomic History     Marital status:      Spouse name: Sharon     Number of children: 1     Years of education: Not on file     Highest education level: Not on file   Social Needs     Financial resource strain: Not on file     Food insecurity - worry: Not on file     Food insecurity - inability: Not on file     Transportation needs - medical: Not on file     Transportation needs - non-medical: Not on file   Occupational History     Occupation: Marketing   Tobacco Use     Smoking status: Never Smoker     Smokeless tobacco: Never Used   Substance and Sexual Activity     Alcohol use: Yes     Drug use: No     Sexual activity: Yes     Partners: Male     Birth control/protection: Post-menopausal   Other Topics Concern     Not on file   Social History Narrative     Not on file       Review of Systems  General:  Denies problem  Eyes: Denies  "problem  Ears/Nose/Throat: Denies problem  Cardiovascular: Denies problem  Respiratory:  Denies problem  Gastrointestinal:  denies problems  Genitourinary: denies problems  Musculoskeletal:  Denies problem  Skin: Denies problem  Neurologic:denies problems  Psychiatric: denies problems  Endocrine: denies problems        Objective:         Vitals:    02/13/19 1047   BP: 100/60   Pulse: 60   Weight: 137 lb (62.1 kg)   Height: 5' 4.25\" (1.632 m)       Physical Exam:  General Appearance: Alert, cooperative, no distress, appears stated age    "

## 2021-06-24 NOTE — PATIENT INSTRUCTIONS - HE
To order supplements go to the web site: [employer]  Use my authorization number to order the recommended supplements: S99    Hair and Nails Formula- one capsule daily     VitaPrime Iron-free multivitamins with minerals 1 twice daily      Here is information about the [place] Adrenocortex Stress profile:    1) The kit will be sent to your home.  I do not need to sign the requisition form.     2) Follow directions to collect the 6 salivary samples according to the designated times then send the kit back as directed.      3) You do need to include payment and there are 2 options.     If you want to do self - pay the cost is $159 and there will be no further billing charges.  If you want to see if insurance will pay, you can submit initial payment of $75 and [employer] will bill your insurance.  You could be responsible for up to $295 total if they do not collect enough.  You can go online at [email or web address] to review these options.

## 2021-06-25 NOTE — TELEPHONE ENCOUNTER
I spoke with her about the echocardiogram.  It is normal. Not having much symptoms but some.  On monitor now.  Will wait and see what that shows.  Dr. Maribel MD

## 2021-06-27 ENCOUNTER — HEALTH MAINTENANCE LETTER (OUTPATIENT)
Age: 56
End: 2021-06-27

## 2021-07-03 NOTE — ADDENDUM NOTE
Addendum Note by Megan Tillman APRN, CNM at 4/25/2017 12:36 PM     Author: Megan Tillman APRN, CNM Service: -- Author Type: Midwife    Filed: 4/25/2017 12:36 PM Encounter Date: 4/12/2017 Status: Signed    : Megan Tillman APRN, CNM (Midwife)    Addended by: MEGAN TILLMAN on: 4/25/2017 12:36 PM        Modules accepted: Orders

## 2021-07-04 NOTE — ADDENDUM NOTE
Addendum Note by Megan Tillman APRN, CNM at 3/17/2021 10:40 AM     Author: Megan Tillman APRN, CNM Service: -- Author Type: Midwife    Filed: 3/17/2021 11:51 PM Encounter Date: 3/17/2021 Status: Signed    : Megan Tillman APRN, CNM (Midwife)    Addended by: MEGAN TILLMAN on: 3/17/2021 11:51 PM        Modules accepted: Orders

## 2021-07-06 VITALS — HEIGHT: 65 IN | BODY MASS INDEX: 22.16 KG/M2 | WEIGHT: 133 LBS

## 2021-07-14 PROBLEM — E78.00 HYPERCHOLESTEROLEMIA: Status: RESOLVED | Noted: 2018-04-25 | Resolved: 2020-02-27

## 2021-08-23 ENCOUNTER — TRANSFERRED RECORDS (OUTPATIENT)
Dept: HEALTH INFORMATION MANAGEMENT | Facility: CLINIC | Age: 56
End: 2021-08-23

## 2021-10-17 ENCOUNTER — HEALTH MAINTENANCE LETTER (OUTPATIENT)
Age: 56
End: 2021-10-17

## 2021-10-29 ENCOUNTER — TELEPHONE (OUTPATIENT)
Dept: MIDWIFE SERVICES | Facility: CLINIC | Age: 56
End: 2021-10-29

## 2021-10-29 NOTE — TELEPHONE ENCOUNTER
This writer reached Pamela by phone.  Recommend walk-in clinic evaluation if complaining of dysuria considering the potential sequelae of waiting 6 days for evaluation including but not limited to pyelonephritis or sepsis.  Pamela has verbal understanding of and agrees with the plan of care.

## 2021-10-29 NOTE — TELEPHONE ENCOUNTER
TAHMINAI to [facility] - pt said it hurts to urinate. Has never had a uti before, but thinks it could be one. We are booked today. Pt wants to wait until 11/4 to see you. Writer told pt if she get a fever or feels worse to go to walk in clinic over the weekend or call us on Monday to be seen. Just wanted [facility] to know about pt wanting to wait until next Thurs to see her specifically.

## 2021-11-02 ENCOUNTER — ANCILLARY PROCEDURE (OUTPATIENT)
Dept: MAMMOGRAPHY | Facility: CLINIC | Age: 56
End: 2021-11-02
Attending: ADVANCED PRACTICE MIDWIFE
Payer: COMMERCIAL

## 2021-11-02 DIAGNOSIS — Z12.31 VISIT FOR SCREENING MAMMOGRAM: ICD-10-CM

## 2021-11-02 PROCEDURE — 77067 SCR MAMMO BI INCL CAD: CPT | Mod: TC | Performed by: RADIOLOGY

## 2021-11-02 PROCEDURE — 77063 BREAST TOMOSYNTHESIS BI: CPT | Performed by: RADIOLOGY

## 2021-11-04 ENCOUNTER — OFFICE VISIT (OUTPATIENT)
Dept: MIDWIFE SERVICES | Facility: CLINIC | Age: 56
End: 2021-11-04
Payer: COMMERCIAL

## 2021-11-04 VITALS
DIASTOLIC BLOOD PRESSURE: 54 MMHG | HEART RATE: 54 BPM | BODY MASS INDEX: 21.83 KG/M2 | WEIGHT: 131 LBS | HEIGHT: 65 IN | SYSTOLIC BLOOD PRESSURE: 106 MMHG

## 2021-11-04 DIAGNOSIS — Z00.00 HEALTHCARE MAINTENANCE: Primary | ICD-10-CM

## 2021-11-04 LAB
CHOLEST SERPL-MCNC: 200 MG/DL
FASTING STATUS PATIENT QL REPORTED: YES
HDLC SERPL-MCNC: 79 MG/DL
LDLC SERPL CALC-MCNC: 112 MG/DL
TRIGL SERPL-MCNC: 45 MG/DL

## 2021-11-04 PROCEDURE — 80061 LIPID PANEL: CPT | Performed by: ADVANCED PRACTICE MIDWIFE

## 2021-11-04 PROCEDURE — 36415 COLL VENOUS BLD VENIPUNCTURE: CPT | Performed by: ADVANCED PRACTICE MIDWIFE

## 2021-11-04 PROCEDURE — 99213 OFFICE O/P EST LOW 20 MIN: CPT | Performed by: ADVANCED PRACTICE MIDWIFE

## 2021-11-04 ASSESSMENT — MIFFLIN-ST. JEOR: SCORE: 1167.15

## 2021-11-05 NOTE — PROGRESS NOTES
"Assessment:   1.  Vaginal irritation after sexual intercourse without symptoms of infection, suspect vaginal dryness     Plan:      1. In the absence of dysuria or vaginal infection symptoms, defer UA with UC if indicated or vaginal cultures.  2. Blood tests: Fasting lipid panel per patient request.  3.  Reviewed quality vaginal lubricants such as Theo glide, silky [place] and coconut oil.  Pamela will utilize coconut oil daily and with sexual intercourse for a month and reevaluate.  She will introduce evening primrose oil after 4 weeks if symptoms persist, then follow-up with this writer.  4.  Well woman exam is due March 2022.  5.  Next pap due with next well woman exam (specifically April 2022). HPV co-testing discussed with that pap, then paps q 5 yrs if both negative.  6.  RTC 4 months for annual physical exam, PRN    Subjective:   Jeni Cortes, AKA \"Pamela\", is a 56 year old female who presents for vaginal irritation and erythema after sexual intercourse.  Initially, she originally suspected may be a UTI since the erythema and irritation seemed to be near urethra, below clitoris.  Her symptoms resolved after 1 to 2 days.  She has noticed this symptom recently following sexual intercourse (which occurs about once weekly).  She is currently taking hormone replacement therapy p.o.  She has tried Theo glide with sexual intercourse, but, \"it dries out.\"  Prior to starting hormone replacement therapy p.o., she did utilize bioidentical hormone topical cream utilized on the vulva for about 2 years.  She remembers a pharmacist warning her that the cream can cause clitorimegaly that would be irreversible.  Pamela is wondering if this could be true?  She does not endorse suspicions of infidelity.    Chronic right shoulder pain.  ~7 months ago, decided to change her diet from exclusively vegan to consuming animal protein again (meat and dairy).  Her right shoulder pain has improved since.  She continues to exercise " regularly including weightlifting.  Wondering if ibuprofen 200 mg p.o. at bedtime is harmful since it is the only thing that reduces her pain.  The right shoulder pain is most aggravated at night in bed.  She is fasting today.  Requesting repeat lipid panel.        Immunization History   Administered Date(s) Administered     COVID-19,PF,Pfizer (12+ Yrs) 2021, 2021     Flu, Unspecified 2019, 10/28/2020     Influenza Vaccine IM > 6 months Valent IIV4 (Alfuria,Fluzone) 2013, 2016, 10/03/2018, 10/28/2020, 10/19/2021     Influenza Vaccine, 6+MO IM (QUADRIVALENT W/PRESERVATIVES) 10/27/2014     Tdap (Adacel,Boostrix) 2017     Zoster vaccine recombinant adjuvanted (SHINGRIX) 10/17/2018, 2019     Immunization status: UTD.    Gynecologic History  No LMP recorded. Patient is postmenopausal.  Contraception: post menopausal status    Cancer screening:   Last Pap: 2017. Results were: Normal/HPV negative  Last mammogram: 21. Results were: Additional mammographic images and possible ultrasound of the left breast required for additional imaging evaluation.      OB History    Para Term  AB Living   1 1 1 0 0 1   SAB TAB Ectopic Multiple Live Births   0 0 0 0 1      # Outcome Date GA Lbr Elijah/2nd Weight Sex Delivery Anes PTL Lv   1 Term     M    OMAR      Name: Vernon       Current Outpatient Medications   Medication Sig Dispense Refill     eflornithine (VANIQA) 13.9 % cream [EFLORNITHINE (VANIQA) 13.9 % CREAM] Apply to affected area twice daily 15 g 2     ESTRACE 1 mg tablet [ESTRACE 1 MG TABLET] Take 1 tablet (1 mg total) by mouth daily. 90 tablet 3     medroxyPROGESTERone (PROVERA) 2.5 MG tablet [MEDROXYPROGESTERONE (PROVERA) 2.5 MG TABLET] Take 1 tablet (2.5 mg total) by mouth daily. 90 tablet 3     multivitamin therapeutic tablet [MULTIVITAMIN THERAPEUTIC TABLET] Take 1 tablet by mouth daily.       cyanocobalamin, vitamin B-12, (VITAMIN B12 ORAL)  [CYANOCOBALAMIN, VITAMIN B-12, (VITAMIN B12 ORAL)] Take by mouth. (Patient not taking: Reported on 11/4/2021)       Past Medical History:   Diagnosis Date     Mitral valve prolapse      Past Surgical History:   Procedure Laterality Date     ARTHROSCOPY SHOULDER Right 10/2019     WISDOM TOOTH EXTRACTION       Patient has no known allergies.  Family History   Problem Relation Age of Onset     Asthma Mother      Depression Mother      Vision Loss Mother      Cancer Mother         kidney     Dementia Father      Hearing Loss Father      Heart Disease Father      Coronary Artery Disease Father 59.00     Breast Cancer Sister      Vision Loss Sister      Depression Son      Social History     Socioeconomic History     Marital status:      Spouse name: Not on file     Number of children: 1     Years of education: Not on file     Highest education level: Not on file   Occupational History     Not on file   Tobacco Use     Smoking status: Never Smoker     Smokeless tobacco: Never Used   Substance and Sexual Activity     Alcohol use: Yes     Drug use: No     Sexual activity: Yes     Partners: Male     Birth control/protection: Post-menopausal   Other Topics Concern     Not on file   Social History Narrative          Social Determinants of Health     Financial Resource Strain:      Difficulty of Paying Living Expenses:    Food Insecurity:      Worried About Running Out of Food in the Last Year:      Ran Out of Food in the Last Year:    Transportation Needs:      Lack of Transportation (Medical):      Lack of Transportation (Non-Medical):    Physical Activity:      Days of Exercise per Week:      Minutes of Exercise per Session:    Stress:      Feeling of Stress :    Social Connections:      Frequency of Communication with Friends and Family:      Frequency of Social Gatherings with Friends and Family:      Attends [employer] Services:      Active Member of Clubs or Organizations:      Attends Club or Organization  "Meetings:      Marital Status:    Intimate Partner Violence:      Fear of Current or Ex-Partner:      Emotionally Abused:      Physically Abused:      Sexually Abused:        Review of Systems  General:  Denies problem  Genitourinary: Please see subjective/HPI    Objective:      Vitals:    11/04/21 1256   BP: 106/54   Pulse: 54   Weight: 59.4 kg (131 lb)   Height: 1.638 m (5' 4.5\")       Physical Exam:  General Appearance: Alert, cooperative, no distress, appears stated age  Pelvic: Deferred.   "

## 2021-11-11 ENCOUNTER — ANCILLARY PROCEDURE (OUTPATIENT)
Dept: MAMMOGRAPHY | Facility: CLINIC | Age: 56
End: 2021-11-11
Attending: ADVANCED PRACTICE MIDWIFE
Payer: COMMERCIAL

## 2021-11-11 DIAGNOSIS — N64.89 BREAST ASYMMETRY: ICD-10-CM

## 2021-11-11 PROCEDURE — 77061 BREAST TOMOSYNTHESIS UNI: CPT | Mod: LT

## 2021-11-11 PROCEDURE — 77065 DX MAMMO INCL CAD UNI: CPT | Mod: LT

## 2022-02-17 ENCOUNTER — OFFICE VISIT (OUTPATIENT)
Dept: MIDWIFE SERVICES | Facility: CLINIC | Age: 57
End: 2022-02-17
Payer: COMMERCIAL

## 2022-02-17 VITALS
BODY MASS INDEX: 21.83 KG/M2 | DIASTOLIC BLOOD PRESSURE: 68 MMHG | WEIGHT: 131 LBS | SYSTOLIC BLOOD PRESSURE: 106 MMHG | HEART RATE: 56 BPM | HEIGHT: 65 IN

## 2022-02-17 DIAGNOSIS — N94.10 DYSPAREUNIA IN FEMALE: ICD-10-CM

## 2022-02-17 DIAGNOSIS — Z01.419 WELL WOMAN EXAM: Primary | ICD-10-CM

## 2022-02-17 DIAGNOSIS — Z12.4 CERVICAL CANCER SCREENING: ICD-10-CM

## 2022-02-17 DIAGNOSIS — N95.1 HOT FLASHES, MENOPAUSAL: ICD-10-CM

## 2022-02-17 DIAGNOSIS — N85.4: ICD-10-CM

## 2022-02-17 PROBLEM — M85.851 OSTEOPENIA OF NECKS OF BOTH FEMURS: Status: ACTIVE | Noted: 2021-04-01

## 2022-02-17 PROBLEM — M85.852 OSTEOPENIA OF NECKS OF BOTH FEMURS: Status: ACTIVE | Noted: 2021-04-01

## 2022-02-17 PROCEDURE — 99396 PREV VISIT EST AGE 40-64: CPT | Performed by: ADVANCED PRACTICE MIDWIFE

## 2022-02-17 PROCEDURE — 87624 HPV HI-RISK TYP POOLED RSLT: CPT | Performed by: ADVANCED PRACTICE MIDWIFE

## 2022-02-17 PROCEDURE — G0123 SCREEN CERV/VAG THIN LAYER: HCPCS | Performed by: ADVANCED PRACTICE MIDWIFE

## 2022-02-17 RX ORDER — ESTRADIOL 0.1 MG/G
CREAM VAGINAL
Qty: 42.5 G | Refills: 1 | Status: SHIPPED | OUTPATIENT
Start: 2022-02-17 | End: 2022-04-22

## 2022-02-17 RX ORDER — PAROXETINE 10 MG/1
20 TABLET, FILM COATED ORAL EVERY MORNING
Qty: 30 TABLET | Refills: 3 | Status: SHIPPED | OUTPATIENT
Start: 2022-02-17 | End: 2023-02-01

## 2022-02-17 NOTE — PROGRESS NOTES
Assessment:   1.  Annual well woman exam  2.  Cervical cancer screening  3.  Postmenopausal  4.  Hot flashes treated with hormone replacement therapy  5.  BMI 22  6.  Dyspareunia  7.  Cervix and uterus deviated to patient's left     Plan:      1. Discussed nutrition and exercise.  Advised MVI, Vitamin D3 4000IU geltab and an omega 3 supplement daily   2. Blood tests: Plan to obtain fasting labs when RTC.  3. Breast self exam technique reviewed and patient encouraged to perform self-exam monthly.  Screening mammogram UTD performed 11/2021.  2/11/2022.  4. Contraception: Postmenopausal  5.  Next pap due today. HPV co-testing discussed with that pap, then paps q 5 yrs if both negative.  6.  Reviewed methods of improving hot flashes.  Accepting of Paxil 10 mg p.o. nightly, #30, 3 refills.  Explained this medication is FDA approved to treat hot flashes in addition to hormone replacement therapy.  Additionally, recommend Unisom 25 mg 1 p.o. nightly, OTC.  7.  Estrace vaginal cream: 2 g per vagina every other night for 2 weeks, then every third night, 42.5 g, 1 refill.  8.  Pelvic ultrasound ordered due to cervix and uterus deviated to patient's left.  9.  RTC in 6 to 8 weeks or sooner if symptoms worsen.  10.  RTC in 1 year for annual physical exam, PRN    Subjective:   Jeni Cortes is a 56 year old female who presents for an annual exam.   Accepting of cervical cancer screening today.  Takes hormone replacement therapy for hot flashes.  Due to being on HRT for 5 years, patient attempted to wean off, but hot flashes resumed.  When she brought herself back to the original dose, her hot flashes persisted, not as intensely or frequently, but she continues to experience them at night.  Additionally, she has poor sleep quality: She falls asleep well but awakens between 2 and 3 AM falling back to sleep at 5 AM and sleeping until 7 AM.  She is interested in learning more about what she can do to treat them.  She would  "like to continue on HRT at this time.  She understands there is an increased risk for breast cancer while using HRT.  She has noticed dyspareunia for a few months.  She and her  tried Theo glide the consistency of which she did not like, and it did not last.  Coconut oil has been okay.  She has noticed that at about 2 inches in with insertion of penis for intercourse, she experiences pain.  \"I have a high pain threshold.\"  She is curious whether this has to do with hormones or a muscle issue.  Exercising regularly, weightlifting, with a healthy diet.  Requesting a \"hormone panel.\"    Healthy Habits:   Regular Exercise: Yes   Sunscreen Use: Yes  Healthy Diet: Yes  Dental Visits Regularly: Yes  Seat Belt: Yes  Sexually active: Yes  STI risk No  domestic violence No    Self Breast Exam Monthly:Yes  Colonoscopy: Cologuard 2019  Lipid Profile: Yes  Glucose Screen: Yes  Prevention of Osteoporosis: Yes  Last Dexa: N/A      Immunization History   Administered Date(s) Administered     COVID-19,PF,Pfizer (12+ Yrs) 2021, 2021     Flu, Unspecified 2019, 10/28/2020     Influenza Vaccine IM > 6 months Valent IIV4 (Alfuria,Fluzone) 2013, 2016, 10/03/2018, 10/28/2020, 10/19/2021     Influenza Vaccine, 6+MO IM (QUADRIVALENT W/PRESERVATIVES) 10/27/2014     Tdap (Adacel,Boostrix) 2017     Zoster vaccine recombinant adjuvanted (SHINGRIX) 10/17/2018, 2019     Immunization status: Up-to-date.    Gynecologic History  No LMP recorded. Patient is postmenopausal.  Contraception: post menopausal status    Cancer screening:   Last Pap: 2017. Results were: Normal, negative HPV  Last mammogram: 2021. Results were: Normal      OB History    Para Term  AB Living   1 1 1 0 0 1   SAB IAB Ectopic Multiple Live Births   0 0 0 0 1      # Outcome Date GA Lbr Elijah/2nd Weight Sex Delivery Anes PTL Lv   1 Term     M    OMAR      Name: Vernon       Current Outpatient " Medications   Medication Sig Dispense Refill     ESTRACE 1 mg tablet [ESTRACE 1 MG TABLET] Take 1 tablet (1 mg total) by mouth daily. 90 tablet 3     estradiol (ESTRACE) 0.1 MG/GM vaginal cream 2 g per vagina every other night for 2 weeks, then 2 g per vagina every third night thereafter until clinic follow-up 42.5 g 1     medroxyPROGESTERone (PROVERA) 2.5 MG tablet [MEDROXYPROGESTERONE (PROVERA) 2.5 MG TABLET] Take 1 tablet (2.5 mg total) by mouth daily. 90 tablet 3     multivitamin therapeutic tablet [MULTIVITAMIN THERAPEUTIC TABLET] Take 1 tablet by mouth daily.       PARoxetine (PAXIL) 10 MG tablet Take 2 tablets (20 mg) by mouth every morning 30 tablet 3     cyanocobalamin, vitamin B-12, (VITAMIN B12 ORAL) [CYANOCOBALAMIN, VITAMIN B-12, (VITAMIN B12 ORAL)] Take by mouth. (Patient not taking: Reported on 11/4/2021)       eflornithine (VANIQA) 13.9 % cream [EFLORNITHINE (VANIQA) 13.9 % CREAM] Apply to affected area twice daily (Patient not taking: Reported on 2/17/2022) 15 g 2     Past Medical History:   Diagnosis Date     Mitral valve prolapse      Past Surgical History:   Procedure Laterality Date     ARTHROSCOPY SHOULDER Right 10/2019     WISDOM TOOTH EXTRACTION       Patient has no known allergies.  Family History   Problem Relation Age of Onset     Asthma Mother      Depression Mother      Vision Loss Mother      Cancer Mother         kidney     Dementia Father      Hearing Loss Father      Heart Disease Father      Coronary Artery Disease Father 59.00     Breast Cancer Sister      Vision Loss Sister      Depression Son      Social History     Socioeconomic History     Marital status:      Spouse name: Not on file     Number of children: 1     Years of education: Not on file     Highest education level: Not on file   Occupational History     Not on file   Tobacco Use     Smoking status: Never Smoker     Smokeless tobacco: Never Used   Substance and Sexual Activity     Alcohol use: Yes     Drug use: No  "    Sexual activity: Yes     Partners: Male     Birth control/protection: Post-menopausal   Other Topics Concern     Not on file   Social History Narrative          Social Determinants of Health     Financial Resource Strain: Not on file   Food Insecurity: Not on file   Transportation Needs: Not on file   Physical Activity: Not on file   Stress: Not on file   Social Connections: Not on file   Intimate Partner Violence: Not on file   Housing Stability: Not on file       Review of Systems  General:  Denies problem  Eyes: Denies problem  Ears/Nose/Throat: Denies problem  Cardiovascular: Denies problem  Respiratory:  Denies problem  Gastrointestinal:  denies problems  Genitourinary: See subjective/HPI please  Musculoskeletal:  Denies problem  Skin: Denies problem  Neurologic:denies problems  Psychiatric: anxiety symptoms  Endocrine: denies problems        Objective:      [unfilled]  Vitals:    02/17/22 1301   BP: 106/68   Pulse: 56   Weight: 59.4 kg (131 lb)   Height: 1.638 m (5' 4.5\")       Physical Exam:  General Appearance: Alert, cooperative, no distress, appears stated age  Skin: Skin color, texture, turgor normal, no rashes or lesions  RASHAUN: grossly normal; otoscopic and opthalmic exam not performed.   Neck: Supple, symmetrical, trachea midline, no adenopathy;  thyroid: not enlarged, symmetric, no tenderness/mass/nodules  Lungs: Clear to auscultation bilaterally, respirations unlabored  Breasts: No breast masses, tenderness, asymmetry, or nipple discharge.  Heart: Regular rate and rhythm, S1 and S2 normal, no murmur   Abdomen: Soft, non-tender. No organomegaly or masses  Pelvic:External genitalia normal without lesions or irritation. Vagina and cervix show no lesions, inflammation, discharge or tenderness although pale in color. No cystocele, No rectocele. Uterus & adnexal normal without masses or tenderness.   "

## 2022-02-19 PROBLEM — N85.4: Status: ACTIVE | Noted: 2022-02-19

## 2022-02-21 LAB
HUMAN PAPILLOMA VIRUS 16 DNA: NEGATIVE
HUMAN PAPILLOMA VIRUS 18 DNA: NEGATIVE
HUMAN PAPILLOMA VIRUS FINAL DIAGNOSIS: NORMAL
HUMAN PAPILLOMA VIRUS OTHER HR: NEGATIVE

## 2022-02-23 LAB
BKR LAB AP GYN ADEQUACY: NORMAL
BKR LAB AP GYN INTERPRETATION: NORMAL
BKR LAB AP HPV REFLEX: NORMAL
BKR LAB AP PREVIOUS ABNORMAL: NORMAL
PATH REPORT.COMMENTS IMP SPEC: NORMAL
PATH REPORT.COMMENTS IMP SPEC: NORMAL
PATH REPORT.RELEVANT HX SPEC: NORMAL

## 2022-03-17 DIAGNOSIS — N95.1 HOT FLASHES, MENOPAUSAL: ICD-10-CM

## 2022-03-17 RX ORDER — ESTRADIOL 1 MG/1
1 TABLET ORAL DAILY
Qty: 90 TABLET | Refills: 0 | Status: SHIPPED | OUTPATIENT
Start: 2022-03-17 | End: 2022-05-18

## 2022-03-17 NOTE — TELEPHONE ENCOUNTER
"Last Written Prescription Date:  3.17.2021  Last Fill Quantity: 90,  # refills: 3   Last office visit provider:  4.28.2021     Requested Prescriptions   Pending Prescriptions Disp Refills     ESTRACE 1 MG tablet 90 tablet 3     Sig: Take 1 tablet (1 mg) by mouth daily       Hormone Replacement Therapy Passed - 3/17/2022 10:46 AM        Passed - Blood pressure under 140/90 in past 12 months     BP Readings from Last 3 Encounters:   02/17/22 106/68   11/04/21 106/54   04/28/21 110/62                 Passed - Recent (12 mo) or future (30 days) visit within the authorizing provider's specialty     Patient has had an office visit with the authorizing provider or a provider within the authorizing providers department within the previous 12 mos or has a future within next 30 days. See \"Patient Info\" tab in inbasket, or \"Choose Columns\" in Meds & Orders section of the refill encounter.              Passed - Patient has mammogram in past 2 years on file if age 50-75        Passed - Medication is active on med list        Passed - Patient is 18 years of age or older        Passed - No active pregnancy on record        Passed - No positive pregnancy test on record in past 12 months             Kesha Chow RN 03/17/22 1:46 PM  "

## 2022-03-17 NOTE — TELEPHONE ENCOUNTER
Refill Request  Medication name: Pending Prescriptions:                       Disp   Refills    ESTRACE 1 MG tablet                       90 tab*3            Sig: Take 1 tablet (1 mg) by mouth daily    Who prescribed the medication: PCP  Last refill on medication: 3/17/21  Requested [facility]acy: [facility]  Last appointment with PCP: 2/17/22  Next appointment: Not due

## 2022-04-18 DIAGNOSIS — N94.10 DYSPAREUNIA IN FEMALE: ICD-10-CM

## 2022-04-19 NOTE — TELEPHONE ENCOUNTER
"Routing refill request to provider for review/approval because:  PCP to review SIG    Last Written Prescription Date:  2022  Last Fill Quantity: 42.5 g,  # refills: 1   Last office visit provider:  2021 Dr. López     Requested Prescriptions   Pending Prescriptions Disp Refills     estradiol (ESTRACE) 0.1 MG/GM vaginal cream 42.5 g 1     Si g per vagina every other night for 2 weeks, then 2 g per vagina every third night thereafter until clinic follow-up       Hormone Replacement Therapy Passed - 2022  7:28 AM        Passed - Blood pressure under 140/90 in past 12 months     BP Readings from Last 3 Encounters:   22 106/68   21 106/54   21 110/62                 Passed - Recent (12 mo) or future (30 days) visit within the authorizing provider's specialty     Patient has had an office visit with the authorizing provider or a provider within the authorizing providers department within the previous 12 mos or has a future within next 30 days. See \"Patient Info\" tab in inbasket, or \"Choose Columns\" in Meds & Orders section of the refill encounter.              Passed - Patient has mammogram in past 2 years on file if age 50-75        Passed - Medication is active on med list        Passed - Patient is 18 years of age or older        Passed - No active pregnancy on record        Passed - No positive pregnancy test on record in past 12 months             Sahara Kaur RN 22 2:19 PM  "

## 2022-04-22 RX ORDER — ESTRADIOL 0.1 MG/G
CREAM VAGINAL
Qty: 42.5 G | Refills: 1 | Status: SHIPPED | OUTPATIENT
Start: 2022-04-22 | End: 2022-05-16

## 2022-05-09 DIAGNOSIS — N95.1 HOT FLASHES, MENOPAUSAL: ICD-10-CM

## 2022-05-09 DIAGNOSIS — Z76.0 ENCOUNTER FOR MEDICATION REFILL: Primary | ICD-10-CM

## 2022-05-09 RX ORDER — MEDROXYPROGESTERONE ACETATE 2.5 MG/1
2.5 TABLET ORAL DAILY
Qty: 90 TABLET | Refills: 3 | Status: SHIPPED | OUTPATIENT
Start: 2022-05-09 | End: 2023-03-17

## 2022-05-13 ENCOUNTER — TELEPHONE (OUTPATIENT)
Dept: MIDWIFE SERVICES | Facility: CLINIC | Age: 57
End: 2022-05-13
Payer: COMMERCIAL

## 2022-05-13 DIAGNOSIS — N94.10 DYSPAREUNIA IN FEMALE: ICD-10-CM

## 2022-05-13 RX ORDER — ESTRADIOL 0.1 MG/G
CREAM VAGINAL
Qty: 42.5 G | Refills: 1 | Status: CANCELLED | OUTPATIENT
Start: 2022-05-13

## 2022-05-13 NOTE — TELEPHONE ENCOUNTER
Pt has been unable to get a refill on the Estradial for some time. She needs it. [facility] [address] in [facility] is her pharmacy. She has not needed a prior auth in the past. Pt also wants ANÍBAL to know that the vaginal cream did nothing for her and so she will not need that again.

## 2022-05-13 NOTE — TELEPHONE ENCOUNTER
I have tried to reach patient by telephone. No answer. Unable to leave message: mailbox full. I have sent a [employer] message to her to get clarification on which medication she would like refilled.

## 2022-05-13 NOTE — TELEPHONE ENCOUNTER
Last office visit 2/17/22 with Megan Ojeda CNM.   No future visit scheduled.     Medication requested refill:   estradiol (ESTRACE) 0.1 MG/GM vaginal cream           [id number] - [place], [place] - [address] AT SEC OF [facility] & [address]  [phone]

## 2022-05-16 DIAGNOSIS — N94.10 DYSPAREUNIA IN FEMALE: ICD-10-CM

## 2022-05-16 RX ORDER — ESTRADIOL 0.1 MG/G
CREAM VAGINAL
Qty: 42.5 G | Refills: 1 | Status: SHIPPED | OUTPATIENT
Start: 2022-05-16 | End: 2023-02-01

## 2022-05-18 ENCOUNTER — TELEPHONE (OUTPATIENT)
Dept: MIDWIFE SERVICES | Facility: CLINIC | Age: 57
End: 2022-05-18
Payer: COMMERCIAL

## 2022-05-18 DIAGNOSIS — N95.1 HOT FLASHES, MENOPAUSAL: ICD-10-CM

## 2022-05-18 RX ORDER — ESTRADIOL 1 MG/1
1 TABLET ORAL DAILY
Qty: 90 TABLET | Refills: 3 | Status: SHIPPED | OUTPATIENT
Start: 2022-05-18 | End: 2023-03-15

## 2022-05-18 NOTE — TELEPHONE ENCOUNTER
Attempted to call patient to inform her that her requested refill has been sent to her preferred pharmacy.  No answer.  Voicemail box is full.  Unable to leave a voicemail.  [employer] message sent to patient.

## 2022-05-18 NOTE — TELEPHONE ENCOUNTER
There has been  a mix up with her refill request. [facility] said the vaginal cream was refilled. Pt does NOT want the cream. She wants the Estradial oral blue pills  That are 1 mg each. She takes this every morning with the medroxy progesterone. Do not send request to Dr Oneal as [facility] has done. Pt states Megan EDMONDSON prescribed the oral Estradial years ago. Please refill the oral pills only.

## 2022-10-01 ENCOUNTER — HEALTH MAINTENANCE LETTER (OUTPATIENT)
Age: 57
End: 2022-10-01

## 2023-01-31 ASSESSMENT — ENCOUNTER SYMPTOMS
DYSURIA: 0
SHORTNESS OF BREATH: 0
NAUSEA: 0
ABDOMINAL PAIN: 0
ARTHRALGIAS: 0
CONSTIPATION: 0
HEARTBURN: 0
HEADACHES: 0
DIZZINESS: 0
FEVER: 0
NERVOUS/ANXIOUS: 0
PARESTHESIAS: 0
HEMATURIA: 0
WEAKNESS: 0
HEMATOCHEZIA: 0
JOINT SWELLING: 0
EYE PAIN: 0
SORE THROAT: 0
PALPITATIONS: 0
FREQUENCY: 0
DIARRHEA: 0
MYALGIAS: 0
BREAST MASS: 0
CHILLS: 0
COUGH: 0

## 2023-02-01 ENCOUNTER — ANCILLARY PROCEDURE (OUTPATIENT)
Dept: MAMMOGRAPHY | Facility: CLINIC | Age: 58
End: 2023-02-01
Attending: INTERNAL MEDICINE
Payer: COMMERCIAL

## 2023-02-01 ENCOUNTER — OFFICE VISIT (OUTPATIENT)
Dept: INTERNAL MEDICINE | Facility: CLINIC | Age: 58
End: 2023-02-01
Payer: COMMERCIAL

## 2023-02-01 VITALS
WEIGHT: 135 LBS | TEMPERATURE: 97.2 F | DIASTOLIC BLOOD PRESSURE: 70 MMHG | BODY MASS INDEX: 22.49 KG/M2 | SYSTOLIC BLOOD PRESSURE: 108 MMHG | HEART RATE: 61 BPM | HEIGHT: 65 IN | OXYGEN SATURATION: 99 %

## 2023-02-01 DIAGNOSIS — Z00.00 ENCOUNTER FOR GENERAL HEALTH EXAMINATION: ICD-10-CM

## 2023-02-01 DIAGNOSIS — E55.9 VITAMIN D DEFICIENCY: ICD-10-CM

## 2023-02-01 DIAGNOSIS — Z12.11 SCREEN FOR COLON CANCER: Primary | ICD-10-CM

## 2023-02-01 DIAGNOSIS — Z12.31 VISIT FOR SCREENING MAMMOGRAM: ICD-10-CM

## 2023-02-01 DIAGNOSIS — Z78.0 MENOPAUSE: ICD-10-CM

## 2023-02-01 DIAGNOSIS — Z78.0 MENOPAUSE: Primary | ICD-10-CM

## 2023-02-01 DIAGNOSIS — Z13.220 SCREENING FOR HYPERLIPIDEMIA: ICD-10-CM

## 2023-02-01 PROBLEM — B00.1 HERPES LABIALIS: Status: RESOLVED | Noted: 2018-04-25 | Resolved: 2023-02-01

## 2023-02-01 LAB
ALBUMIN SERPL BCG-MCNC: 4.8 G/DL (ref 3.5–5.2)
ALP SERPL-CCNC: 61 U/L (ref 35–104)
ALT SERPL W P-5'-P-CCNC: 22 U/L (ref 10–35)
ANION GAP SERPL CALCULATED.3IONS-SCNC: 10 MMOL/L (ref 7–15)
AST SERPL W P-5'-P-CCNC: 35 U/L (ref 10–35)
BILIRUB SERPL-MCNC: 0.5 MG/DL
BUN SERPL-MCNC: 21.6 MG/DL (ref 8–23)
CALCIUM SERPL-MCNC: 9.4 MG/DL (ref 8.6–10)
CHLORIDE SERPL-SCNC: 105 MMOL/L (ref 98–107)
CHOLEST SERPL-MCNC: 224 MG/DL
CREAT SERPL-MCNC: 0.83 MG/DL (ref 0.51–0.95)
DEPRECATED HCO3 PLAS-SCNC: 26 MMOL/L (ref 22–29)
ERYTHROCYTE [DISTWIDTH] IN BLOOD BY AUTOMATED COUNT: 11.3 % (ref 10–15)
ESTRADIOL SERPL-MCNC: 57 PG/ML
GFR SERPL CREATININE-BSD FRML MDRD: 81 ML/MIN/1.73M2
GLUCOSE SERPL-MCNC: 93 MG/DL (ref 70–99)
HCT VFR BLD AUTO: 40.7 % (ref 35–47)
HDLC SERPL-MCNC: 88 MG/DL
HGB BLD-MCNC: 13.5 G/DL (ref 11.7–15.7)
LDLC SERPL CALC-MCNC: 122 MG/DL
MCH RBC QN AUTO: 30.9 PG (ref 26.5–33)
MCHC RBC AUTO-ENTMCNC: 33.2 G/DL (ref 31.5–36.5)
MCV RBC AUTO: 93 FL (ref 78–100)
NONHDLC SERPL-MCNC: 136 MG/DL
PLATELET # BLD AUTO: 171 10E3/UL (ref 150–450)
POTASSIUM SERPL-SCNC: 4.2 MMOL/L (ref 3.4–5.3)
PROT SERPL-MCNC: 7.3 G/DL (ref 6.4–8.3)
RBC # BLD AUTO: 4.37 10E6/UL (ref 3.8–5.2)
SODIUM SERPL-SCNC: 141 MMOL/L (ref 136–145)
TRIGL SERPL-MCNC: 68 MG/DL
WBC # BLD AUTO: 3.3 10E3/UL (ref 4–11)

## 2023-02-01 PROCEDURE — 84403 ASSAY OF TOTAL TESTOSTERONE: CPT | Performed by: INTERNAL MEDICINE

## 2023-02-01 PROCEDURE — 84270 ASSAY OF SEX HORMONE GLOBUL: CPT | Performed by: INTERNAL MEDICINE

## 2023-02-01 PROCEDURE — 80061 LIPID PANEL: CPT | Performed by: INTERNAL MEDICINE

## 2023-02-01 PROCEDURE — 77067 SCR MAMMO BI INCL CAD: CPT | Mod: TC | Performed by: RADIOLOGY

## 2023-02-01 PROCEDURE — 99207 PR NO CHARGE LOS: CPT | Performed by: INTERNAL MEDICINE

## 2023-02-01 PROCEDURE — 85027 COMPLETE CBC AUTOMATED: CPT | Performed by: INTERNAL MEDICINE

## 2023-02-01 PROCEDURE — 80053 COMPREHEN METABOLIC PANEL: CPT | Performed by: INTERNAL MEDICINE

## 2023-02-01 PROCEDURE — 99396 PREV VISIT EST AGE 40-64: CPT | Performed by: INTERNAL MEDICINE

## 2023-02-01 PROCEDURE — 77063 BREAST TOMOSYNTHESIS BI: CPT | Mod: TC | Performed by: RADIOLOGY

## 2023-02-01 PROCEDURE — 82670 ASSAY OF TOTAL ESTRADIOL: CPT | Performed by: INTERNAL MEDICINE

## 2023-02-01 PROCEDURE — 82306 VITAMIN D 25 HYDROXY: CPT | Performed by: INTERNAL MEDICINE

## 2023-02-01 PROCEDURE — 36415 COLL VENOUS BLD VENIPUNCTURE: CPT | Performed by: INTERNAL MEDICINE

## 2023-02-01 PROCEDURE — 99214 OFFICE O/P EST MOD 30 MIN: CPT | Mod: 25 | Performed by: INTERNAL MEDICINE

## 2023-02-01 NOTE — PROGRESS NOTES
SUBJECTIVE:   CC: Pamela is an 57 year old who presents for preventive health visit.     HPI:  Main concern is vaginal dryness, which hasn't responded to oral estrace with progestin.  She otherwise feels well. She had normal PAP, and GYN evaluation and hot flashes have resolved.  Tolerates walking well, without dyspnea or cough, and no bowel or bladder issues.     Patient has been advised of split billing requirements and indicates understanding: Yes  Healthy Habits:     Getting at least 3 servings of Calcium per day:  NO    Bi-annual eye exam:  Yes    Dental care twice a year:  Yes    Sleep apnea or symptoms of sleep apnea:  None    Diet:  Regular (no restrictions)    Frequency of exercise:  6-7 days/week    Duration of exercise:  45-60 minutes    Taking medications regularly:  Yes    Medication side effects:  None    PHQ-2 Total Score: 0    Additional concerns today:  Yes    Today's PHQ-2 Score:   PHQ-2 ( 1999 Pfizer) 1/31/2023   Q1: Little interest or pleasure in doing things 0   Q2: Feeling down, depressed or hopeless 0   PHQ-2 Score 0   Q1: Little interest or pleasure in doing things Not at all   Q2: Feeling down, depressed or hopeless Not at all   PHQ-2 Score 0     Social History     Tobacco Use     Smoking status: Never     Smokeless tobacco: Never   Substance Use Topics     Alcohol use: Yes     Alcohol Use 1/31/2023   Prescreen: >3 drinks/day or >7 drinks/week? No     Reviewed orders with patient.  Reviewed health maintenance and updated orders accordingly - Yes    Breast Cancer Screening:    FHS-7:   Breast CA Risk Assessment (FHS-7) 11/2/2021 11/11/2021 1/31/2023   Did any of your first-degree relatives have breast or ovarian cancer? Yes Yes Yes   Did any of your relatives have bilateral breast cancer? - No Yes   Did any man in your family have breast cancer? - No No   Did any woman in your family have breast and ovarian cancer? - No Yes   Did any woman in your family have breast cancer before age 50 y?  "Yes No No   Do you have 2 or more relatives with breast and/or ovarian cancer? No No No   Do you have 2 or more relatives with breast and/or bowel cancer? No No No     Pertinent mammograms are reviewed under the imaging tab.    History of abnormal Pap smear:   PAP / HPV Latest Ref Rng & Units 2/17/2022 4/12/2017   PAP   Negative for Intraepithelial Lesion or Malignancy (NILM) Negative for squamous intraepithelial lesion or malignancy  Electronically signed by Joanne Garcia CT (ASCP) on 4/18/2017 at  3:34 PM     HPV16 Negative Negative -   HPV18 Negative Negative -   HRHPV Negative Negative -     Reviewed and updated as needed this visit by clinical staff   Tobacco  Allergies             Reviewed and updated as needed this visit by Provider     Past Medical History:   Diagnosis Date     Mitral valve prolapse     negative echocardiogram 4/2021      Past Surgical History:   Procedure Laterality Date     ARTHROSCOPY SHOULDER Right 10/2019     WISDOM TOOTH EXTRACTION       See HPI     OBJECTIVE:     PHYSICAL EXAM:  General Appearance: In no acute distress  /70 (BP Location: Left arm, Patient Position: Sitting, Cuff Size: Adult Small)   Pulse 61   Temp 97.2  F (36.2  C) (Tympanic)   Ht 1.638 m (5' 4.5\")   Wt 61.2 kg (135 lb)   SpO2 99%   BMI 22.81 kg/m     RESPIRATORY:  Clear   CARDIOVASCULAR: S1, S2  ABDOMEN: soft, flat, and non-tender  EXTREMITIES: No joint swelling, no ulcer or edema  NEUROLOGIC: No arm or leg  weakness, speech is clear, gait is normal  PSYCHIATRIC: Oriented X 3,  behavior and affect normal, thinking is clear    ASSESSMENT/PLAN:     Jeni was seen today for physical.    Diagnoses and all orders for this visit:    Screen for colon cancer  -     COLOGUARD([employer]); Future    Menopause  She would like hormone testing.    -     Estradiol  -     Testosterone Free and Total    Encounter for general health examination  There are no other major issues with her health evaluation  - "     Comprehensive metabolic panel  -     CBC with platelets    R/O Vitamin D deficiency  -     Vitamin D Deficiency    Screening for hyperlipidemia  -     Lipid panel reflex to direct LDL Fasting    Atrophic vaginitis  Symptoms of vaginal dryness. She would like to try the vaginal tablet therapy - 4 mcg estradiol daily for 2 weeks, the twice weekly for total of 3 months.  Follow up if fails to improve.     COUNSELING:  Reviewed preventive health counseling, as reflected in patient instructions       Healthy diet/nutrition       Vision screening       Osteoporosis prevention/bone health       Colorectal Cancer Screening    She reports that she has never smoked. She has never used smokeless tobacco.    Maurice López MD  [facility]

## 2023-02-02 ENCOUNTER — LAB (OUTPATIENT)
Dept: INTERNAL MEDICINE | Facility: CLINIC | Age: 58
End: 2023-02-02
Payer: COMMERCIAL

## 2023-02-02 DIAGNOSIS — Z12.11 SCREEN FOR COLON CANCER: ICD-10-CM

## 2023-02-03 DIAGNOSIS — N95.2 ATROPHIC VAGINITIS: Primary | ICD-10-CM

## 2023-02-03 LAB
DEPRECATED CALCIDIOL+CALCIFEROL SERPL-MC: 20 UG/L (ref 20–75)
SHBG SERPL-SCNC: 134 NMOL/L (ref 30–135)
TESTOST FREE SERPL-MCNC: 0.24 NG/DL
TESTOST SERPL-MCNC: 38 NG/DL (ref 8–60)

## 2023-02-03 RX ORDER — ESTRADIOL 10 UG/1
10 INSERT VAGINAL
Qty: 24 TABLET | Refills: 3 | Status: SHIPPED | OUTPATIENT
Start: 2023-02-06 | End: 2024-05-08

## 2023-03-15 DIAGNOSIS — N95.1 HOT FLASHES, MENOPAUSAL: ICD-10-CM

## 2023-03-15 RX ORDER — ESTRADIOL 0.5 MG/1
1 TABLET ORAL DAILY
Qty: 180 TABLET | Refills: 0 | OUTPATIENT
Start: 2023-03-15

## 2023-03-15 RX ORDER — ESTRADIOL 1 MG/1
1 TABLET ORAL DAILY
Qty: 90 TABLET | Refills: 3 | Status: SHIPPED | OUTPATIENT
Start: 2023-03-15 | End: 2023-03-16

## 2023-03-15 NOTE — TELEPHONE ENCOUNTER
Incoming faxed refill request received from [facility] in [place], [place].  Medication requested is:    Pending Prescriptions:                       Disp   Refills    estradiol (ESTRACE) 0.5 MG tablet         180 ta*0            Sig: Take 2 tablets (1 mg) by mouth daily    Medication was last prescribed on 5/18/22.  Patient's most recent visit with the [facility] CNM group was on 2/17/22.  There are no upcoming scheduled appointments with this group.  Refill request sent to on-call CNM for review.

## 2023-03-16 ENCOUNTER — NURSE TRIAGE (OUTPATIENT)
Dept: NURSING | Facility: CLINIC | Age: 58
End: 2023-03-16
Payer: COMMERCIAL

## 2023-03-16 ENCOUNTER — TELEPHONE (OUTPATIENT)
Dept: INTERNAL MEDICINE | Facility: CLINIC | Age: 58
End: 2023-03-16
Payer: COMMERCIAL

## 2023-03-16 DIAGNOSIS — N95.1 HOT FLASHES, MENOPAUSAL: ICD-10-CM

## 2023-03-16 RX ORDER — ESTRADIOL 0.5 MG/1
1 TABLET ORAL DAILY
Qty: 180 TABLET | Refills: 3 | Status: SHIPPED | OUTPATIENT
Start: 2023-03-16 | End: 2023-09-11

## 2023-03-16 NOTE — TELEPHONE ENCOUNTER
Prior Authorization Request    1. Prior Authorization for the medication estradiol (ESTRACE) 0.5 MG tablet         Requesting Provider: Maurice López   Pharmacy name and location: [place] [address]        Pt name: Jeni ANDERSON Mohitaman        Pt : 1965        Pt MRN: [id number]        Last Office Visit: 3/16/2023           Insurance: Payor: [employer] / Plan: [employer] CARE MA / Product Type: HMO /         Insurance ID Number: [unfilled]        Prior Auth Contact Phone number:     BIN#:   PCN#:         CMM KEY:      Informed patient that prior authorizations can take up to 10 business days  for response NA

## 2023-03-16 NOTE — TELEPHONE ENCOUNTER
Is calling from [place] about Refill prescription issues. Spoke with PCP yesterday. This writer asked that patient call tomorrow to speak to PCP clinic regarding this .     ALTAGRACIA NO RN  [facility] nurse advisors  3/16/2023  5:11 PM    Reason for Disposition    [1] Caller requesting NON-URGENT health information AND [2] PCP's office is the best resource    Additional Information    Negative: RN needs further essential information from caller in order to complete triage    Protocols used: INFORMATION ONLY CALL - NO TRIAGE-A-

## 2023-03-17 DIAGNOSIS — Z76.0 ENCOUNTER FOR MEDICATION REFILL: ICD-10-CM

## 2023-03-17 DIAGNOSIS — N95.1 HOT FLASHES, MENOPAUSAL: ICD-10-CM

## 2023-03-17 NOTE — TELEPHONE ENCOUNTER
Routing refill request to provider for review/approval because:  Drug not on the [facility] refill protocol     Last Written Prescription Date:  5/9/2022  Last Fill Quantity: 90,  # refills: 3   Last office visit provider:  2/1/2023     Requested Prescriptions   Pending Prescriptions Disp Refills     medroxyPROGESTERone (PROVERA) 2.5 MG tablet 90 tablet 3     Sig: Take 1 tablet (2.5 mg) by mouth daily       There is no refill protocol information for this order          Bambi Cook RN 03/17/23 3:22 PM

## 2023-03-18 RX ORDER — MEDROXYPROGESTERONE ACETATE 2.5 MG/1
2.5 TABLET ORAL DAILY
Qty: 90 TABLET | Refills: 0 | Status: SHIPPED | OUTPATIENT
Start: 2023-03-18 | End: 2023-09-18

## 2023-03-20 NOTE — TELEPHONE ENCOUNTER
Central Prior Authorization Team   Phone: [phone]    PA Initiation    Medication: estradiol (ESTRACE) 0.5 MG tablet  Insurance Company: [employer] - Phone [phone] Fax [phone]  Pharmacy Filling the Rx: [id number] [place], [place] - [address]  Filling Pharmacy Phone: [phone]  Filling Pharmacy Fax:    Start Date: 3/20/2023

## 2023-03-21 NOTE — TELEPHONE ENCOUNTER
Prior Authorization Not Needed per Insurance    Medication: estradiol (ESTRACE) 0.5 MG tablet  Insurance Company: [employer] - Phone [phone] Fax [phone]  Expected CoPay:      Pharmacy Filling the Rx: [id number] [place], [place] - [address] [place], [place] [address] AT [facility]  Pharmacy Notified: Yes  Patient Notified: No  Patient can get up to a 30 day supply pharmacy was billing for 90 days.

## 2023-06-02 ENCOUNTER — OFFICE VISIT (OUTPATIENT)
Dept: FAMILY MEDICINE | Facility: CLINIC | Age: 58
End: 2023-06-02
Payer: COMMERCIAL

## 2023-06-02 ENCOUNTER — TRANSFERRED RECORDS (OUTPATIENT)
Dept: HEALTH INFORMATION MANAGEMENT | Facility: CLINIC | Age: 58
End: 2023-06-02

## 2023-06-02 VITALS
OXYGEN SATURATION: 99 % | SYSTOLIC BLOOD PRESSURE: 100 MMHG | TEMPERATURE: 97 F | BODY MASS INDEX: 21.66 KG/M2 | RESPIRATION RATE: 13 BRPM | DIASTOLIC BLOOD PRESSURE: 67 MMHG | WEIGHT: 130 LBS | HEART RATE: 59 BPM | HEIGHT: 65 IN

## 2023-06-02 DIAGNOSIS — N95.1 HOT FLASHES, MENOPAUSAL: ICD-10-CM

## 2023-06-02 DIAGNOSIS — Z12.11 SCREEN FOR COLON CANCER: Primary | ICD-10-CM

## 2023-06-02 PROCEDURE — 99212 OFFICE O/P EST SF 10 MIN: CPT | Performed by: FAMILY MEDICINE

## 2023-06-02 NOTE — PROGRESS NOTES
Assessment & Plan   Screen for colon cancer  - Colonoscopy Screening  Referral    Hot flashes, menopausal  She is doing what she can in regards to dressing in layers, etc.  Lab review was good.  No findings that point to another cause.  Discussed using a dilator regularly and before sex so sex is not uncomfortable. I cannot guarantee that the dilator will fix it so it will not be needed, this may be an ongoing procedure. The estradiol may do better if also applied to the labia minora. Recommended a water soluble lubricant made for sexual pleasure (rather than KY) that only requires a few drops.    Advanced care planning discussed.  Return for Medicare wellness soon.  KIRK BULL MD  [facility] LORNA Santiago is a 57 year old, presenting for the following health issues:  Establish Care (Establish care and med check) and Recheck Medication        6/2/2023    12:41 PM   Additional Questions   Roomed by vijay moore   Accompanied by self         6/2/2023    12:41 PM   Patient Reported Additional Medications   Patient reports taking the following new medications none     She has decided wants a female physician and someone recommended me.  She works hard to stay healthy.   Started estradiol after menopause 10 years ago. She is having hot flashes again. Her gynecologist recommended a higher dose of estradiol. They started 6-8  Months ago, but improved 1-2 months ago when she started lifting weights.  She wants to be sexually active with her , but it has become uncomfortable. More than just dry. She has not liked the vaginal suppositories.    History of Present Illness       Reason for visit:  I'd like Dr. Bull to be my new primary doctor (she comes recommended). And I'd like to discuss my HRT.    She eats 4 or more servings of fruits and vegetables daily.She consumes 0 sweetened beverage(s) daily.She exercises with enough effort to increase her heart rate 20 to 29 minutes  "per day.  She exercises with enough effort to increase her heart rate 6 days per week.   She is taking medications regularly.   Review of Systems   Eyes: Negative for pain.   All other systems reviewed and are negative.   Objective    /67 (BP Location: Left arm, Patient Position: Sitting, Cuff Size: Adult Regular)   Pulse 59   Temp 97  F (36.1  C) (Oral)   Resp 13   Ht 1.651 m (5' 5\")   Wt 59 kg (130 lb)   LMP  (LMP Unknown)   SpO2 99%   BMI 21.63 kg/m    Body mass index is 21.63 kg/m .  Physical Exam   GENERAL: healthy, alert and no distress  EYES: Eyes grossly normal to inspection, PERRL and conjunctivae and sclerae normal  HENT: ear canals and TM's normal, nose and mouth without ulcers or lesions  NECK: no adenopathy, no asymmetry, masses, or scars and thyroid normal to palpation  RESP: lungs clear to auscultation - no rales, rhonchi or wheezes  BREAST: deferred to gynecology  CV: regular rate and rhythm, normal S1 S2, no S3 or S4, no murmur, click or rub, no peripheral edema and peripheral pulses strong  ABDOMEN: soft, nontender, no hepatosplenomegaly, no masses and bowel sounds normal  MS: no gross musculoskeletal defects noted, no edema  SKIN: no suspicious lesions or rashes  NEURO: Normal strength and tone, mentation intact and speech normal  PSYCH: mentation appears normal, affect normal/bright  No results found for any visits on 06/02/23.    "

## 2023-06-08 ASSESSMENT — ENCOUNTER SYMPTOMS: EYE PAIN: 0

## 2023-09-07 DIAGNOSIS — N95.2 ATROPHIC VAGINITIS: ICD-10-CM

## 2023-09-07 RX ORDER — ESTRADIOL 10 UG/1
10 INSERT VAGINAL
Qty: 24 TABLET | Refills: 3 | OUTPATIENT
Start: 2023-09-07

## 2023-09-08 NOTE — TELEPHONE ENCOUNTER
"  Last Written Prescription Date:  2/6/23  Last Fill Quantity: 24,  # refills: 3   Last office visit provider:       Requested Prescriptions   Pending Prescriptions Disp Refills    estradiol (VAGIFEM) 10 MCG TABS vaginal tablet 24 tablet 3     Sig: Place 1 tablet (10 mcg) vaginally twice a week       Hormone Replacement Therapy Passed - 9/7/2023  5:44 PM        Passed - Blood pressure under 140/90 in past 12 months     BP Readings from Last 3 Encounters:   06/02/23 100/67   02/01/23 108/70   02/17/22 106/68                 Passed - Recent (12 mo) or future (30 days) visit within the authorizing provider's specialty     Patient has had an office visit with the authorizing provider or a provider within the authorizing providers department within the previous 12 mos or has a future within next 30 days. See \"Patient Info\" tab in inbasket, or \"Choose Columns\" in Meds & Orders section of the refill encounter.              Passed - Patient has mammogram in past 2 years on file if age 50-75        Passed - Medication is active on med list        Passed - Patient is 18 years of age or older        Passed - No active pregnancy on record        Passed - No positive pregnancy test on record in past 12 months             Roberta Patel RN 09/07/23 9:45 PM  "

## 2023-09-11 ENCOUNTER — MYC MEDICAL ADVICE (OUTPATIENT)
Dept: FAMILY MEDICINE | Facility: CLINIC | Age: 58
End: 2023-09-11
Payer: COMMERCIAL

## 2023-09-11 DIAGNOSIS — N95.1 HOT FLASHES, MENOPAUSAL: ICD-10-CM

## 2023-09-11 DIAGNOSIS — Z76.0 ENCOUNTER FOR MEDICATION REFILL: ICD-10-CM

## 2023-09-11 RX ORDER — MEDROXYPROGESTERONE ACETATE 2.5 MG/1
2.5 TABLET ORAL DAILY
Qty: 90 TABLET | Refills: 0 | Status: CANCELLED | OUTPATIENT
Start: 2023-09-11

## 2023-09-18 RX ORDER — MEDROXYPROGESTERONE ACETATE 2.5 MG/1
2.5 TABLET ORAL DAILY
Qty: 90 TABLET | Refills: 0 | Status: SHIPPED | OUTPATIENT
Start: 2023-09-18 | End: 2023-12-26

## 2023-09-18 NOTE — TELEPHONE ENCOUNTER
Pt calling back, she is trying to fill the medroxyprogesterone not the estradiol.    She is going to be traveling and needs this asap.    Please call to clarify or once it has been sent

## 2023-10-04 ENCOUNTER — TRANSFERRED RECORDS (OUTPATIENT)
Dept: HEALTH INFORMATION MANAGEMENT | Facility: CLINIC | Age: 58
End: 2023-10-04
Payer: COMMERCIAL

## 2023-10-10 ENCOUNTER — ALLIED HEALTH/NURSE VISIT (OUTPATIENT)
Dept: FAMILY MEDICINE | Facility: CLINIC | Age: 58
End: 2023-10-10
Payer: COMMERCIAL

## 2023-10-10 DIAGNOSIS — Z23 NEED FOR VACCINATION: Primary | ICD-10-CM

## 2023-10-10 PROCEDURE — 90682 RIV4 VACC RECOMBINANT DNA IM: CPT

## 2023-10-10 PROCEDURE — 90471 IMMUNIZATION ADMIN: CPT

## 2023-10-10 PROCEDURE — 90472 IMMUNIZATION ADMIN EACH ADD: CPT

## 2023-10-10 PROCEDURE — 90746 HEPB VACCINE 3 DOSE ADULT IM: CPT

## 2023-10-10 PROCEDURE — 99207 PR NO CHARGE NURSE ONLY: CPT

## 2023-12-26 ENCOUNTER — MYC REFILL (OUTPATIENT)
Dept: FAMILY MEDICINE | Facility: CLINIC | Age: 58
End: 2023-12-26
Payer: COMMERCIAL

## 2023-12-26 DIAGNOSIS — N95.1 HOT FLASHES, MENOPAUSAL: ICD-10-CM

## 2023-12-26 DIAGNOSIS — Z76.0 ENCOUNTER FOR MEDICATION REFILL: ICD-10-CM

## 2023-12-27 RX ORDER — MEDROXYPROGESTERONE ACETATE 2.5 MG/1
2.5 TABLET ORAL DAILY
Qty: 90 TABLET | Refills: 0 | Status: SHIPPED | OUTPATIENT
Start: 2023-12-27 | End: 2024-03-29

## 2024-01-04 ENCOUNTER — PATIENT OUTREACH (OUTPATIENT)
Dept: CARE COORDINATION | Facility: CLINIC | Age: 59
End: 2024-01-04
Payer: COMMERCIAL

## 2024-01-18 ENCOUNTER — PATIENT OUTREACH (OUTPATIENT)
Dept: CARE COORDINATION | Facility: CLINIC | Age: 59
End: 2024-01-18
Payer: COMMERCIAL

## 2024-03-03 ENCOUNTER — HEALTH MAINTENANCE LETTER (OUTPATIENT)
Age: 59
End: 2024-03-03

## 2024-03-29 ENCOUNTER — MYC REFILL (OUTPATIENT)
Dept: FAMILY MEDICINE | Facility: CLINIC | Age: 59
End: 2024-03-29
Payer: COMMERCIAL

## 2024-03-29 DIAGNOSIS — Z76.0 ENCOUNTER FOR MEDICATION REFILL: ICD-10-CM

## 2024-03-29 DIAGNOSIS — N95.1 HOT FLASHES, MENOPAUSAL: ICD-10-CM

## 2024-03-29 RX ORDER — MEDROXYPROGESTERONE ACETATE 2.5 MG/1
2.5 TABLET ORAL DAILY
Qty: 90 TABLET | Refills: 0 | OUTPATIENT
Start: 2024-03-29

## 2024-03-29 RX ORDER — MEDROXYPROGESTERONE ACETATE 2.5 MG/1
2.5 TABLET ORAL DAILY
Qty: 90 TABLET | Refills: 0 | Status: SHIPPED | OUTPATIENT
Start: 2024-03-29 | End: 2024-07-18

## 2024-05-07 SDOH — HEALTH STABILITY: PHYSICAL HEALTH: ON AVERAGE, HOW MANY DAYS PER WEEK DO YOU ENGAGE IN MODERATE TO STRENUOUS EXERCISE (LIKE A BRISK WALK)?: 6 DAYS

## 2024-05-07 SDOH — HEALTH STABILITY: PHYSICAL HEALTH: ON AVERAGE, HOW MANY MINUTES DO YOU ENGAGE IN EXERCISE AT THIS LEVEL?: 90 MIN

## 2024-05-07 ASSESSMENT — SOCIAL DETERMINANTS OF HEALTH (SDOH): HOW OFTEN DO YOU GET TOGETHER WITH FRIENDS OR RELATIVES?: MORE THAN THREE TIMES A WEEK

## 2024-05-08 ENCOUNTER — OFFICE VISIT (OUTPATIENT)
Dept: FAMILY MEDICINE | Facility: CLINIC | Age: 59
End: 2024-05-08
Payer: COMMERCIAL

## 2024-05-08 VITALS
WEIGHT: 137.6 LBS | DIASTOLIC BLOOD PRESSURE: 73 MMHG | OXYGEN SATURATION: 100 % | SYSTOLIC BLOOD PRESSURE: 117 MMHG | BODY MASS INDEX: 22.92 KG/M2 | TEMPERATURE: 97.1 F | HEART RATE: 80 BPM | HEIGHT: 65 IN | RESPIRATION RATE: 16 BRPM

## 2024-05-08 DIAGNOSIS — R09.89 THROAT CLEARING: ICD-10-CM

## 2024-05-08 DIAGNOSIS — Z00.00 ROUTINE GENERAL MEDICAL EXAMINATION AT A HEALTH CARE FACILITY: Primary | ICD-10-CM

## 2024-05-08 DIAGNOSIS — Z82.49 FAMILY HISTORY OF ISCHEMIC HEART DISEASE: ICD-10-CM

## 2024-05-08 DIAGNOSIS — Z12.11 SCREEN FOR COLON CANCER: ICD-10-CM

## 2024-05-08 LAB
BASOPHILS # BLD AUTO: 0 10E3/UL (ref 0–0.2)
BASOPHILS NFR BLD AUTO: 1 %
EOSINOPHIL # BLD AUTO: 0.2 10E3/UL (ref 0–0.7)
EOSINOPHIL NFR BLD AUTO: 4 %
ERYTHROCYTE [DISTWIDTH] IN BLOOD BY AUTOMATED COUNT: 11.6 % (ref 10–15)
HCT VFR BLD AUTO: 40.3 % (ref 35–47)
HGB BLD-MCNC: 12.9 G/DL (ref 11.7–15.7)
IMM GRANULOCYTES # BLD: 0 10E3/UL
IMM GRANULOCYTES NFR BLD: 0 %
LYMPHOCYTES # BLD AUTO: 1.8 10E3/UL (ref 0.8–5.3)
LYMPHOCYTES NFR BLD AUTO: 37 %
MCH RBC QN AUTO: 30.4 PG (ref 26.5–33)
MCHC RBC AUTO-ENTMCNC: 32 G/DL (ref 31.5–36.5)
MCV RBC AUTO: 95 FL (ref 78–100)
MONOCYTES # BLD AUTO: 0.4 10E3/UL (ref 0–1.3)
MONOCYTES NFR BLD AUTO: 9 %
NEUTROPHILS # BLD AUTO: 2.3 10E3/UL (ref 1.6–8.3)
NEUTROPHILS NFR BLD AUTO: 49 %
PLATELET # BLD AUTO: 176 10E3/UL (ref 150–450)
RBC # BLD AUTO: 4.24 10E6/UL (ref 3.8–5.2)
WBC # BLD AUTO: 4.8 10E3/UL (ref 4–11)

## 2024-05-08 PROCEDURE — 80053 COMPREHEN METABOLIC PANEL: CPT | Performed by: FAMILY MEDICINE

## 2024-05-08 PROCEDURE — 85025 COMPLETE CBC W/AUTO DIFF WBC: CPT | Performed by: FAMILY MEDICINE

## 2024-05-08 PROCEDURE — 84443 ASSAY THYROID STIM HORMONE: CPT | Performed by: FAMILY MEDICINE

## 2024-05-08 PROCEDURE — 99396 PREV VISIT EST AGE 40-64: CPT | Performed by: FAMILY MEDICINE

## 2024-05-08 PROCEDURE — 99212 OFFICE O/P EST SF 10 MIN: CPT | Mod: 25 | Performed by: FAMILY MEDICINE

## 2024-05-08 PROCEDURE — 80061 LIPID PANEL: CPT | Performed by: FAMILY MEDICINE

## 2024-05-08 PROCEDURE — 36415 COLL VENOUS BLD VENIPUNCTURE: CPT | Performed by: FAMILY MEDICINE

## 2024-05-08 NOTE — PROGRESS NOTES
Preventive Care Visit  [facility]  KIRK IZAGUIRRE MD, Family Medicine  May 8, 2024    Assessment & Plan   Routine general medical examination at a health care facility  - Comprehensive metabolic panel (BMP + Alb, Alk Phos, ALT, AST, Total. Bili, TP)  - TSH with free T4 reflex  - Lipid panel reflex to direct LDL Fasting  - CBC with platelets and differential    Family history of ischemic heart disease  Her ASCVD risk is low, but it does not take into account her family history with is dire.  - CT Coronary Calcium Scan    Throat clearing  I suspect this is vocal cord irritation that is self perpetuated.  Drink fluid when the urge to clear occurs. Hopefully, she can not clear her throat for a while and it will resolve.  Discussed reflux laryngitis.   Never a smoker.    Screen for colon cancer  Discussed the advantages and disadvantages of screening with FIT, vs Cologuard, vs colonoscopy. She made an educated decision for the FIT test.  - Fecal colorectal cancer screen (FIT)    Counseling  Appropriate preventive services were discussed with this patient, including applicable screening as appropriate for fall prevention, nutrition, physical activity, Tobacco-use cessation, weight loss and cognition.  Checklist reviewing preventive services available has been given to the patient.  Reviewed patient's diet, addressing concerns and/or questions.   Fei Santiago is a 58 year old, presenting for the following:  Physical and Recheck Medication (Pt is here physical)        5/8/2024    12:19 PM   Additional Questions   Roomed by rafy         5/8/2024    12:19 PM   Patient Reported Additional Medications   Patient reports taking the following new medications no        Health Care Directive  Patient does not have a Health Care Directive or Living Will: Discussed advance care planning with patient; information given to patient to review.    Had been eating more eggs and meat since she a serious weight  . Gets about 4 eggs yolks a week. Concerned because her parents and grandparents all had heart attacks, father was in his 50's and his father  at 40's.  Told she had mitral valve prolapse years ago. Gets a little pressure which is only nonexertional and lasts a couple minutes. It is not associated with nausea, unexpected sweating, shortness of breath or exertional pain in the jaw, arms or back.  Patient declines Covid and RSV shots. Had Covid in November and has been clearing her throat ever since. No phlegm production.          2024   General Health   How would you rate your overall physical health? Excellent   Feel stress (tense, anxious, or unable to sleep) Rather much   (!) STRESS CONCERN      2024   Nutrition   Three or more servings of calcium each day? Yes   Diet: Regular (no restrictions)   How many servings of fruit and vegetables per day? 4 or more   How many sweetened beverages each day? 0-1         2024   Exercise   Days per week of moderate/strenous exercise 6 days   Average minutes spent exercising at this level 90 min         2024   Social Factors   Frequency of gathering with friends or relatives More than three times a week   Worry food won't last until get money to buy more No   Food not last or not have enough money for food? No   Do you have housing?  Yes   Are you worried about losing your housing? No   Lack of transportation? No   Unable to get utilities (heat,electricity)? No         2024   Fall Risk   Fallen 2 or more times in the past year? No   Trouble with walking or balance? No          2024   Dental   Dentist two times every year? Yes         2024   TB Screening   Were you born outside of the [place]? No         Today's PHQ-2 Score:       2024     6:48 PM   PHQ-2 (  [employer])   Q1: Little interest or pleasure in doing things 1   Q2: Feeling down, depressed or hopeless 1   PHQ-2 Score 2   Q1: Little interest or pleasure in doing things Several days    Q2: Feeling down, depressed or hopeless Several days   PHQ-2 Score 2           5/7/2024   Substance Use   Alcohol more than 3/day or more than 7/wk No   Do you use any other substances recreationally? No     Social History     Tobacco Use    Smoking status: Never    Smokeless tobacco: Never   Vaping Use    Vaping status: Never Used   Substance Use Topics    Alcohol use: Yes    Drug use: No           2/1/2023   LAST FHS-7 RESULTS   1st degree relative breast or ovarian cancer No   Any relative bilateral breast cancer No   Any male have breast cancer No   Any ONE woman have BOTH breast AND ovarian cancer No   Any woman with breast cancer before 50yrs Yes   2 or more relatives with breast AND/OR ovarian cancer No   2 or more relatives with breast AND/OR bowel cancer No        Mammogram Screening - Mammogram every 1-2 years updated in Health Maintenance based on mutual decision making        5/7/2024   STI Screening   New sexual partner(s) since last STI/HIV test? No     History of abnormal Pap smear: NO - age 30-65 PAP every 5 years with negative HPV co-testing recommended        Latest Ref Rng & Units 2/17/2022     1:09 PM 4/12/2017    10:05 AM   PAP / HPV   PAP  Negative for Intraepithelial Lesion or Malignancy (NILM)  Negative for squamous intraepithelial lesion or malignancy  Electronically signed by Joanne Garcia CT (ASCP) on 4/18/2017 at  3:34 PM      HPV 16 DNA Negative Negative     HPV 18 DNA Negative Negative     Other HR HPV Negative Negative       ASCVD Risk   The 10-year ASCVD risk score (Harsha ZARCO, et al., 2019) is: 2.2%    Values used to calculate the score:      Age: 58 years      Sex: Female      Is Non- : No      Diabetic: No      Tobacco smoker: No      Systolic Blood Pressure: 117 mmHg      Is BP treated: No      HDL Cholesterol: 88 mg/dL      Total Cholesterol: 224 mg/dL     Reviewed and updated as needed this visit by Provider                  Past Medical  "History:   Diagnosis Date    Mitral valve prolapse     negative echocardiogram 2021     Past Surgical History:   Procedure Laterality Date    ARTHROSCOPY SHOULDER Right 10/2019    WISDOM TOOTH EXTRACTION       OB History    Para Term  AB Living   1 1 1 0 0 1   SAB IAB Ectopic Multiple Live Births   0 0 0 0 1      # Outcome Date GA Lbr Elijah/2nd Weight Sex Type Anes PTL Lv   1 Term     M    [facility]      Name: Vernon     Current Outpatient Medications   Medication Sig Dispense Refill    medroxyPROGESTERone (PROVERA) 2.5 MG tablet TAKE 1 TABLET(2.5 MG) BY MOUTH DAILY 90 tablet 0     No Known Allergies  Review of Systems  Constitutional, HEENT, cardiovascular, pulmonary, GI, , musculoskeletal, neuro, skin, endocrine and psych systems are negative, except as otherwise noted.     Objective    Exam  /73 (BP Location: Left arm, Patient Position: Sitting, Cuff Size: Adult Regular)   Pulse 80   Temp 97.1  F (36.2  C) (Tympanic)   Resp 16   Ht 1.651 m (5' 5\")   Wt 62.4 kg (137 lb 9.6 oz)   LMP  (LMP Unknown)   SpO2 100%   BMI 22.90 kg/m     Estimated body mass index is 22.9 kg/m  as calculated from the following:    Height as of this encounter: 1.651 m (5' 5\").    Weight as of this encounter: 62.4 kg (137 lb 9.6 oz).    Physical Exam  GENERAL: alert and no distress  EYES: Eyes grossly normal to inspection, PERRL and conjunctivae and sclerae normal  HENT: ear canals and TM's normal, nose and mouth without ulcers or lesions  NECK: no adenopathy, no asymmetry, masses, or scars  RESP: lungs clear to auscultation - no rales, rhonchi or wheezes  CV: regular rate and rhythm, normal S1 S2, no S3 or S4, no murmur, click or rub, no peripheral edema  ABDOMEN: soft, nontender, no hepatosplenomegaly, no masses and bowel sounds normal  MS: no gross musculoskeletal defects noted, no edema  SKIN: no suspicious lesions or rashes  NEURO: Normal strength and tone, mentation intact and speech normal  PSYCH: mentation " appears normal, affect normal/bright  Signed Electronically by: KIRK IZAGUIRRE MD

## 2024-05-09 LAB
ALBUMIN SERPL BCG-MCNC: 4.8 G/DL (ref 3.5–5.2)
ALP SERPL-CCNC: 61 U/L (ref 40–150)
ALT SERPL W P-5'-P-CCNC: 22 U/L (ref 0–50)
ANION GAP SERPL CALCULATED.3IONS-SCNC: 8 MMOL/L (ref 7–15)
AST SERPL W P-5'-P-CCNC: 31 U/L (ref 0–45)
BILIRUB SERPL-MCNC: 0.5 MG/DL
BUN SERPL-MCNC: 18.5 MG/DL (ref 8–23)
CALCIUM SERPL-MCNC: 9.7 MG/DL (ref 8.8–10.2)
CHLORIDE SERPL-SCNC: 103 MMOL/L (ref 98–107)
CHOLEST SERPL-MCNC: 241 MG/DL
CREAT SERPL-MCNC: 0.89 MG/DL (ref 0.51–0.95)
DEPRECATED HCO3 PLAS-SCNC: 28 MMOL/L (ref 22–29)
EGFRCR SERPLBLD CKD-EPI 2021: 74 ML/MIN/1.73M2
FASTING STATUS PATIENT QL REPORTED: YES
FASTING STATUS PATIENT QL REPORTED: YES
GLUCOSE SERPL-MCNC: 92 MG/DL (ref 70–99)
HDLC SERPL-MCNC: 94 MG/DL
LDLC SERPL CALC-MCNC: 132 MG/DL
NONHDLC SERPL-MCNC: 147 MG/DL
POTASSIUM SERPL-SCNC: 4.2 MMOL/L (ref 3.4–5.3)
PROT SERPL-MCNC: 7.4 G/DL (ref 6.4–8.3)
SODIUM SERPL-SCNC: 139 MMOL/L (ref 135–145)
TRIGL SERPL-MCNC: 76 MG/DL
TSH SERPL DL<=0.005 MIU/L-ACNC: 1.24 UIU/ML (ref 0.3–4.2)

## 2024-05-15 DIAGNOSIS — N95.1 HOT FLASHES, MENOPAUSAL: ICD-10-CM

## 2024-05-15 DIAGNOSIS — Z76.0 ENCOUNTER FOR MEDICATION REFILL: ICD-10-CM

## 2024-05-15 RX ORDER — MEDROXYPROGESTERONE ACETATE 2.5 MG/1
2.5 TABLET ORAL DAILY
Qty: 90 TABLET | Refills: 0 | OUTPATIENT
Start: 2024-05-15

## 2024-05-15 RX ORDER — ESTRADIOL 2 MG/1
2 TABLET ORAL DAILY
Qty: 90 TABLET | OUTPATIENT
Start: 2024-05-15

## 2024-05-16 PROCEDURE — 82274 ASSAY TEST FOR BLOOD FECAL: CPT | Performed by: FAMILY MEDICINE

## 2024-05-20 LAB — HEMOCCULT STL QL IA: NEGATIVE

## 2024-06-01 ENCOUNTER — HOSPITAL ENCOUNTER (OUTPATIENT)
Dept: CT IMAGING | Facility: CLINIC | Age: 59
Discharge: HOME | End: 2024-06-01
Admitting: FAMILY MEDICINE
Payer: COMMERCIAL

## 2024-06-01 DIAGNOSIS — Z82.49 FAMILY HISTORY OF ISCHEMIC HEART DISEASE: ICD-10-CM

## 2024-06-01 PROCEDURE — 75571 CT HRT W/O DYE W/CA TEST: CPT

## 2024-06-01 PROCEDURE — 75571 CT HRT W/O DYE W/CA TEST: CPT | Mod: 26 | Performed by: GENERAL ACUTE CARE HOSPITAL

## 2024-06-03 LAB
CV CALCIUM SCORE AGATSTON LM: 0
CV CALCIUM SCORING AGATSON LAD: 0
CV CALCIUM SCORING AGATSTON CX: 0
CV CALCIUM SCORING AGATSTON RCA: 0
CV CALCIUM SCORING AGATSTON TOTAL: 0

## 2024-06-13 DIAGNOSIS — Z76.0 ENCOUNTER FOR MEDICATION REFILL: ICD-10-CM

## 2024-06-13 DIAGNOSIS — N95.1 HOT FLASHES, MENOPAUSAL: ICD-10-CM

## 2024-06-13 RX ORDER — MEDROXYPROGESTERONE ACETATE 2.5 MG/1
2.5 TABLET ORAL DAILY
Qty: 90 TABLET | Refills: 0 | OUTPATIENT
Start: 2024-06-13

## 2024-06-13 RX ORDER — PROGESTERONE 100 MG/1
100 CAPSULE ORAL DAILY
Qty: 90 CAPSULE | Refills: 3 | Status: SHIPPED | OUTPATIENT
Start: 2024-06-13

## 2024-07-18 ENCOUNTER — MYC MEDICAL ADVICE (OUTPATIENT)
Dept: FAMILY MEDICINE | Facility: CLINIC | Age: 59
End: 2024-07-18
Payer: COMMERCIAL

## 2024-07-18 DIAGNOSIS — N95.1 HOT FLASHES, MENOPAUSAL: ICD-10-CM

## 2024-07-18 DIAGNOSIS — Z76.0 ENCOUNTER FOR MEDICATION REFILL: ICD-10-CM

## 2024-07-18 RX ORDER — ESTRADIOL 1 MG/1
1 TABLET ORAL DAILY
Qty: 90 TABLET | Refills: 3 | Status: SHIPPED | OUTPATIENT
Start: 2024-07-18

## 2024-07-18 RX ORDER — MEDROXYPROGESTERONE ACETATE 2.5 MG/1
2.5 TABLET ORAL DAILY
Qty: 90 TABLET | Refills: 0 | Status: SHIPPED | OUTPATIENT
Start: 2024-07-18 | End: 2024-07-29

## 2024-07-27 DIAGNOSIS — N95.1 HOT FLASHES, MENOPAUSAL: ICD-10-CM

## 2024-07-27 DIAGNOSIS — Z76.0 ENCOUNTER FOR MEDICATION REFILL: ICD-10-CM

## 2024-07-29 RX ORDER — MEDROXYPROGESTERONE ACETATE 2.5 MG/1
2.5 TABLET ORAL DAILY
Qty: 90 TABLET | Refills: 2 | Status: SHIPPED | OUTPATIENT
Start: 2024-07-29

## 2024-10-21 ENCOUNTER — MYC REFILL (OUTPATIENT)
Dept: FAMILY MEDICINE | Facility: CLINIC | Age: 59
End: 2024-10-21
Payer: COMMERCIAL

## 2024-10-21 DIAGNOSIS — Z76.0 ENCOUNTER FOR MEDICATION REFILL: ICD-10-CM

## 2024-10-21 DIAGNOSIS — N95.1 HOT FLASHES, MENOPAUSAL: ICD-10-CM

## 2024-10-22 RX ORDER — MEDROXYPROGESTERONE ACETATE 2.5 MG/1
2.5 TABLET ORAL DAILY
Qty: 90 TABLET | Refills: 2 | OUTPATIENT
Start: 2024-10-22

## 2024-11-26 ENCOUNTER — MYC MEDICAL ADVICE (OUTPATIENT)
Dept: FAMILY MEDICINE | Facility: CLINIC | Age: 59
End: 2024-11-26
Payer: COMMERCIAL

## 2024-11-26 DIAGNOSIS — Z76.0 ENCOUNTER FOR MEDICATION REFILL: ICD-10-CM

## 2024-11-26 DIAGNOSIS — N95.1 HOT FLASHES, MENOPAUSAL: Primary | ICD-10-CM

## 2024-12-04 RX ORDER — MEDROXYPROGESTERONE ACETATE 5 MG
5 TABLET ORAL DAILY
Qty: 90 TABLET | Refills: 3 | Status: SHIPPED | OUTPATIENT
Start: 2024-12-04 | End: 2024-12-04 | Stop reason: DRUGHIGH

## 2024-12-04 RX ORDER — MEDROXYPROGESTERONE ACETATE 2.5 MG/1
2.5 TABLET ORAL DAILY
Qty: 90 TABLET | Refills: 2 | Status: SHIPPED | OUTPATIENT
Start: 2024-12-04

## 2025-04-08 ENCOUNTER — PATIENT OUTREACH (OUTPATIENT)
Dept: CARE COORDINATION | Facility: CLINIC | Age: 60
End: 2025-04-08
Payer: COMMERCIAL

## 2025-04-22 ENCOUNTER — PATIENT OUTREACH (OUTPATIENT)
Dept: CARE COORDINATION | Facility: CLINIC | Age: 60
End: 2025-04-22
Payer: COMMERCIAL

## 2025-06-08 ENCOUNTER — HEALTH MAINTENANCE LETTER (OUTPATIENT)
Age: 60
End: 2025-06-08